# Patient Record
Sex: FEMALE | Race: WHITE | NOT HISPANIC OR LATINO | Employment: FULL TIME | URBAN - METROPOLITAN AREA
[De-identification: names, ages, dates, MRNs, and addresses within clinical notes are randomized per-mention and may not be internally consistent; named-entity substitution may affect disease eponyms.]

---

## 2017-03-23 ENCOUNTER — APPOINTMENT (OUTPATIENT)
Dept: WOMENS IMAGING | Facility: HOSPITAL | Age: 42
End: 2017-03-23

## 2017-03-23 PROCEDURE — 77063 BREAST TOMOSYNTHESIS BI: CPT | Performed by: RADIOLOGY

## 2017-03-23 PROCEDURE — 77067 SCR MAMMO BI INCL CAD: CPT | Performed by: RADIOLOGY

## 2019-05-28 ENCOUNTER — APPOINTMENT (OUTPATIENT)
Dept: WOMENS IMAGING | Facility: HOSPITAL | Age: 44
End: 2019-05-28

## 2019-05-28 PROCEDURE — 77067 SCR MAMMO BI INCL CAD: CPT | Performed by: RADIOLOGY

## 2019-05-28 PROCEDURE — 77063 BREAST TOMOSYNTHESIS BI: CPT | Performed by: RADIOLOGY

## 2020-07-30 ENCOUNTER — APPOINTMENT (OUTPATIENT)
Dept: WOMENS IMAGING | Facility: HOSPITAL | Age: 45
End: 2020-07-30

## 2020-07-30 PROCEDURE — 77063 BREAST TOMOSYNTHESIS BI: CPT | Performed by: RADIOLOGY

## 2020-07-30 PROCEDURE — 77067 SCR MAMMO BI INCL CAD: CPT | Performed by: RADIOLOGY

## 2020-10-06 ENCOUNTER — OFFICE VISIT (OUTPATIENT)
Dept: CARDIOLOGY | Facility: CLINIC | Age: 45
End: 2020-10-06

## 2020-10-06 VITALS
HEIGHT: 64 IN | BODY MASS INDEX: 31.55 KG/M2 | WEIGHT: 184.8 LBS | HEART RATE: 82 BPM | OXYGEN SATURATION: 100 % | SYSTOLIC BLOOD PRESSURE: 125 MMHG | DIASTOLIC BLOOD PRESSURE: 81 MMHG

## 2020-10-06 DIAGNOSIS — R07.9 CHEST PAIN, UNSPECIFIED TYPE: Primary | ICD-10-CM

## 2020-10-06 DIAGNOSIS — R06.02 SHORTNESS OF BREATH: ICD-10-CM

## 2020-10-06 DIAGNOSIS — R30.0 DYSURIA: ICD-10-CM

## 2020-10-06 DIAGNOSIS — R00.2 PALPITATIONS: ICD-10-CM

## 2020-10-06 PROCEDURE — 99204 OFFICE O/P NEW MOD 45 MIN: CPT | Performed by: PHYSICIAN ASSISTANT

## 2020-10-06 RX ORDER — ACETAMINOPHEN 500 MG
500 TABLET ORAL EVERY 6 HOURS PRN
COMMUNITY

## 2020-10-06 RX ORDER — LISINOPRIL 10 MG/1
TABLET ORAL DAILY
COMMUNITY
Start: 2020-09-29 | End: 2020-12-22

## 2020-10-06 RX ORDER — NITROGLYCERIN 0.4 MG/1
TABLET SUBLINGUAL
Qty: 25 TABLET | Refills: 11 | Status: SHIPPED | OUTPATIENT
Start: 2020-10-06

## 2020-10-06 RX ORDER — IBUPROFEN 600 MG/1
600 TABLET ORAL EVERY 6 HOURS PRN
COMMUNITY

## 2020-10-06 NOTE — PROGRESS NOTES
Subjective   Eva Saleem is a 45 y.o. female     Chief Complaint   Patient presents with   • Establish Care       HPI    Patient is a 45-year-old female that presents to the office today to establish care.    Recently, patient was in the emergency department.  Patient describes being at home 1 day whenever she was sitting and started feeling chest discomfort.  She felt pressure or an aching sensation in the substernal region.  She went to take her blood pressure and apparently it was increasing.  Because of this she went to the emergency room and was ruled out.  It was noted that she had hypertension.  She was recommended to follow-up as an outpatient    She was since placed on lisinopril which has helped her hypertension but she still occasionally has mild discomfort.  It has not been as severe as what she felt on the day of emergency room visit but has been significant.  She does not describe nausea or diaphoresis with her pain.  She does not describe radiation.  She has mild dyspnea with chest discomfort.  No severe or significant exertional dyspnea.  She does not describe PND orthopnea.    She occasionally will feel palpitations.  She describes at times that she will feel uncomfortable sensation as if she is having palpitations and will break out in a sweat before it resolves quickly.  She has had this at random times but it has been noticeable and concerning for her.  She does not describe any syncopal episodes.  Otherwise she is doing well      Current Outpatient Medications   Medication Sig Dispense Refill   • acetaminophen (TYLENOL) 500 MG tablet Take 500 mg by mouth Every 6 (Six) Hours As Needed for Mild Pain .     • ibuprofen (ADVIL,MOTRIN) 600 MG tablet Take 600 mg by mouth Every 6 (Six) Hours As Needed for Mild Pain .     • lisinopril (PRINIVIL,ZESTRIL) 10 MG tablet Take  by mouth Daily.     • nitroglycerin (NITROSTAT) 0.4 MG SL tablet 1 under the tongue as needed for angina, may repeat q5mins for up  "three doses 25 tablet 11     No current facility-administered medications for this visit.        Patient has no known allergies.    History reviewed. No pertinent past medical history.    Social History     Socioeconomic History   • Marital status:      Spouse name: Not on file   • Number of children: Not on file   • Years of education: Not on file   • Highest education level: Not on file   Tobacco Use   • Smoking status: Never Smoker   Substance and Sexual Activity   • Alcohol use: Not Currently     Frequency: Never   • Drug use: Never   • Sexual activity: Defer       Family History   Problem Relation Age of Onset   • Lymphoma Mother    • Hypertension Father    • Diabetes Father    • Gout Sister    • Diabetes Brother        Review of Systems   Constitutional: Negative.    Eyes: Positive for visual disturbance (Reading glasses).   Respiratory: Positive for chest tightness (\"slight tightness\" ) and shortness of breath (Off and on, states it can occur at rest. States it's been an issue recently. ).    Cardiovascular: Positive for chest pain (Occasional \"twinges\" of pain, descirbes as a \"sharp tightness\" ) and palpitations. Negative for leg swelling.   Endocrine: Negative.    Musculoskeletal: Negative.    Skin: Negative.    Allergic/Immunologic: Positive for environmental allergies (animal dander). Negative for food allergies.   Neurological: Positive for headaches (Off and on, denies hx migraines ). Negative for dizziness, syncope, weakness, light-headedness and numbness.   Hematological: Negative.    Psychiatric/Behavioral: Negative.    All other systems reviewed and are negative.      Objective   Vitals:    10/06/20 1455   BP: 125/81   Pulse: 82   SpO2: 100%   Weight: 83.8 kg (184 lb 12.8 oz)   Height: 162.6 cm (64\")      /81   Pulse 82   Ht 162.6 cm (64\")   Wt 83.8 kg (184 lb 12.8 oz)   SpO2 100%   BMI 31.72 kg/m²     Lab Results (most recent)     None          Physical Exam  Vitals signs and " nursing note reviewed.   Constitutional:       General: She is not in acute distress.     Appearance: Normal appearance. She is well-developed.   HENT:      Head: Normocephalic and atraumatic.   Eyes:      General: No scleral icterus.        Right eye: No discharge.         Left eye: No discharge.      Conjunctiva/sclera: Conjunctivae normal.   Neck:      Vascular: No carotid bruit.   Cardiovascular:      Rate and Rhythm: Normal rate and regular rhythm.      Heart sounds: Normal heart sounds. No murmur. No friction rub. No gallop.    Pulmonary:      Effort: Pulmonary effort is normal. No respiratory distress.      Breath sounds: Normal breath sounds. No wheezing or rales.   Chest:      Chest wall: No tenderness.   Musculoskeletal:      Right lower leg: No edema.      Left lower leg: No edema.   Skin:     General: Skin is warm and dry.      Coloration: Skin is not pale.      Findings: No erythema or rash.   Neurological:      Mental Status: She is alert and oriented to person, place, and time.      Cranial Nerves: No cranial nerve deficit.   Psychiatric:         Behavior: Behavior normal.         Procedure   Procedures         Assessment/Plan     Problems Addressed this Visit        Cardiovascular and Mediastinum    Palpitations    Relevant Orders    Adult Transthoracic Echo Complete W/ Cont if Necessary Per Protocol    Cardiac Event Monitor    Treadmill Stress Test    CBC & Differential    Urinalysis With Culture If Indicated -       Respiratory    Shortness of breath    Relevant Orders    Adult Transthoracic Echo Complete W/ Cont if Necessary Per Protocol    Cardiac Event Monitor    Treadmill Stress Test    CBC & Differential    Urinalysis With Culture If Indicated -       Nervous and Auditory    Chest pain - Primary    Relevant Orders    Adult Transthoracic Echo Complete W/ Cont if Necessary Per Protocol    Cardiac Event Monitor    Treadmill Stress Test    CBC & Differential    Urinalysis With Culture If Indicated  -      Other Visit Diagnoses     Dysuria        Relevant Orders    CBC & Differential    Urinalysis With Culture If Indicated -      Diagnoses       Codes Comments    Chest pain, unspecified type    -  Primary ICD-10-CM: R07.9  ICD-9-CM: 786.50     Palpitations     ICD-10-CM: R00.2  ICD-9-CM: 785.1     Shortness of breath     ICD-10-CM: R06.02  ICD-9-CM: 786.05     Dysuria     ICD-10-CM: R30.0  ICD-9-CM: 788.1             Recommendation  1.  Patient with complaints of chest pain and dyspnea.  Chest pain is atypical but is concerning.  I do feel evaluation is warranted to rule out cardiac causes of her symptoms    2.  Patient will undergo regular treadmill stress test for risk stratification    3.  Echocardiogram to evaluate LV structure, LV function, rule out pericardial disease such as an effusion etc.    4.  Event monitor because of her symptoms of dizziness diaphoresis and palpitations.  It will be scheduled for 2 weeks    5.  On review of labs, she had a white count of 14,000.  There was no left shift.  She has not been on steroids.  However she is concerned.  She is concerned about the possibility of infection.  I will repeat a CBC to ensure that this has improved.  She would like urinalysis performed and per her wishes we will order it.  If abnormal, we can send results to primary.  Again patient requests    6.  We will prescribe nitroglycerin as needed for chest pain and I have counseled her on how to use that medication.  She is to continue antihypertensive therapy.  We will see her back for follow-up on testing.  She is to follow with primary as scheduled           Eva Saleem  reports that she has never smoked. She does not have any smokeless tobacco history on file.     Patient's Body mass index is 31.72 kg/m². BMI is above normal parameters. Recommendations include: educational material.         Electronically signed by:

## 2020-10-06 NOTE — PATIENT INSTRUCTIONS
How to Quarantine at Home  Information for Patients and Families    These instructions are for people with confirmed or suspected COVID-19 who do not need to be hospitalized and those with confirmed COVID-19 who were hospitalized and discharged to care for themselves at home.    If you were tested through the Health Department  The Health Department will monitor your wellbeing.  If it is determined that you do not need to be hospitalized and can be isolated at home, you will be monitored by staff from your local or state health department.     If you were tested through a Commercial Lab  You will need to monitor yourself and report changes in your symptoms to your doctor.  See the section below called Monitor Your Symptoms.    Follow these steps until a healthcare provider or local or state health department says you can return to your normal activities.    Stay home except to get medical care  • Restrict activities outside your home, except for getting medical care.   • Do not go to work, school, or public areas.   • Avoid using public transportation, ride-sharing, or taxis.    Separate yourself from other people and animals in your home  People  As much as possible, you should stay in a specific room and away from other people in your home. Also, you should use a separate bathroom, if available.    Animals  You should restrict contact with pets and other animals while you are sick with COVID-19, just like you would around other people. When possible, have another member of your household care for your animals while you are sick. If you are sick with COVID-19, avoid contact with your pet, including petting, snuggling, being kissed or licked, and sharing food. If you must care for your pet or be around animals while you are sick, wash your hands before and after you interact with pets and wear a facemask. See COVID-19 and Animals for more information.    Call ahead before visiting your doctor  If you have a medical  appointment, call the healthcare provider and tell them that you have or may have COVID-19. This information will help the healthcare provider’s office take steps to keep other people from getting infected or exposed.    Wear a facemask  You should wear a facemask when you are around other people (e.g., sharing a room or vehicle) or pets and before you enter a healthcare provider’s office.     If you are not able to wear a facemask (for example, because it causes trouble breathing), then people who live with you should not stay in the same room with you, or they should wear a facemask if they enter your room.    Cover your coughs and sneezes  • Cover your mouth and nose with a tissue when you cough or sneeze.   • Throw used tissues in a lined trash can.   • Immediately wash your hands with soap and water for at least 20 seconds or, if soap and water are not available, clean your hands with an alcohol-based hand  that contains at least 60% alcohol.    Clean your hands often  • Wash your hands often with soap and water for at least 20 seconds, especially after blowing your nose, coughing, or sneezing; going to the bathroom; and before eating or preparing food.     • If soap and water are not readily available, use an alcohol-based hand  with at least 60% alcohol, covering all surfaces of your hands and rubbing them together until they feel dry.    • Soap and water are the best option if hands are visibly dirty. Avoid touching your eyes, nose, and mouth with unwashed hands.    Avoid sharing personal household items  • You should not share dishes, drinking glasses, cups, eating utensils, towels, or bedding with other people or pets in your home.   • After using these items, they should be washed thoroughly with soap and water.    Clean all “high-touch” surfaces everyday  • High touch surfaces include counters, tabletops, doorknobs, bathroom fixtures, toilets, phones, keyboards, tablets, and bedside  tables.   • Also, clean any surfaces that may have blood, stool, or body fluids on them.   • Use a household cleaning spray or wipe, according to the label instructions. Labels contain instructions for safe and effective use of the cleaning product, including precautions you should take when applying the product, such as wearing gloves and making sure you have good ventilation during use of the product.    Monitor your symptoms  • Seek prompt medical attention if your illness is worsening (e.g., difficulty breathing).   • Before seeking care, call your healthcare provider and tell them that you have, or are being evaluated for, COVID-19.   • Put on a facemask before you enter the facility.     • These steps will help the healthcare provider’s office to keep other people in the office or waiting room from getting infected or exposed.   • Persons who are placed under active monitoring or facilitated self-monitoring should follow instructions provided by their local health department or occupational health professionals, as appropriate.  • If you have a medical emergency and need to call 911, notify the dispatch personnel that you have, or are being evaluated for COVID-19. If possible, put on a facemask before emergency medical services arrive.    Discontinuing home isolation  Patients with confirmed COVID-19 should remain under home isolation precautions until the risk of secondary transmission to others is thought to be low. The decision to discontinue home isolation precautions should be made on a case-by-case basis, in consultation with healthcare providers and state and local health departments.    The below content are for household members, intimate partners, and caregivers of a patient with symptomatic laboratory-confirmed COVID-19 or a patient under investigation:    Household members, intimate partners, and caregivers may have close contact with a person with symptomatic, laboratory-confirmed COVID-19 or a  person under investigation.     Close contacts should monitor their health; they should call their healthcare provider right away if they develop symptoms suggestive of COVID-19 (e.g., fever, cough, shortness of breath)     Close contacts should also follow these recommendations:  • Make sure that you understand and can help the patient follow their healthcare provider’s instructions for medication(s) and care. You should help the patient with basic needs in the home and provide support for getting groceries, prescriptions, and other personal needs.  • Monitor the patient’s symptoms. If the patient is getting sicker, call his or her healthcare provider and tell them that the patient has laboratory-confirmed COVID-19. This will help the healthcare provider’s office take steps to keep other people in the office or waiting room from getting infected. Ask the healthcare provider to call the local or [facility] health department for additional guidance. If the patient has a medical emergency and you need to call 911, notify the dispatch personnel that the patient has, or is being evaluated for COVID-19.  • Household members should stay in another room or be  from the patient as much as possible. Household members should use a separate bedroom and bathroom, if available.  • Prohibit visitors who do not have an essential need to be in the home.  • Household members should care for any pets in the home. Do not handle pets or other animals while sick.  For more information, see COVID-19 and Animals.  • Make sure that shared spaces in the home have good air flow, such as by an air conditioner or an opened window, weather permitting.  • Perform hand hygiene frequently. Wash your hands often with soap and water for at least 20 seconds or use an alcohol-based hand  that contains 60 to 95% alcohol, covering all surfaces of your hands and rubbing them together until they feel dry. Soap and water should be used  preferentially if hands are visibly dirty.  • Avoid touching your eyes, nose, and mouth with unwashed hands.  • The patient should wear a facemask when you are around other people. If the patient is not able to wear a facemask (for example, because it causes trouble breathing), you, as the caregiver, should wear a mask when you are in the same room as the patient.  • Wear a disposable facemask and gloves when you touch or have contact with the patient’s blood, stool, or body fluids, such as saliva, sputum, nasal mucus, vomit, or urine.   o Throw out disposable facemasks and gloves after using them. Do not reuse.  o When removing personal protective equipment, first remove and dispose of gloves. Then, immediately clean your hands with soap and water or alcohol-based hand . Next, remove and dispose of facemask, and immediately clean your hands again with soap and water or alcohol-based hand .  • Avoid sharing household items with the patient. You should not share dishes, drinking glasses, cups, eating utensils, towels, bedding, or other items. After the patient uses these items, you should wash them thoroughly (see below “Wash laundry thoroughly”).  • Clean all “high-touch” surfaces, such as counters, tabletops, doorknobs, bathroom fixtures, toilets, phones, keyboards, tablets, and bedside tables, every day. Also, clean any surfaces that may have blood, stool, or body fluids on them.   o Use a household cleaning spray or wipe, according to the label instructions. Labels contain instructions for safe and effective use of the cleaning product including precautions you should take when applying the product, such as wearing gloves and making sure you have good ventilation during use of the product.  • Wash laundry thoroughly.   o Immediately remove and wash clothes or bedding that have blood, stool, or body fluids on them.  o Wear disposable gloves while handling soiled items and keep soiled items away  from your body. Clean your hands (with soap and water or an alcohol-based hand ) immediately after removing your gloves.  o Read and follow directions on labels of laundry or clothing items and detergent. In general, using a normal laundry detergent according to washing machine instructions and dry thoroughly using the warmest temperatures recommended on the clothing label.  • Place all used disposable gloves, facemasks, and other contaminated items in a lined container before disposing of them with other household waste. Clean your hands (with soap and water or an alcohol-based hand ) immediately after handling these items. Soap and water should be used preferentially if hands are visibly dirty.  • Discuss any additional questions with your state or local health department or healthcare provider.    Adapted from information provided by the Centers for Disease Control and Prevention.  For more information, visit https://www.cdc.gov/coronavirus/2019-ncov/hcp/guidance-prevent-spread.htmlFat and Cholesterol Restricted Eating Plan  Getting too much fat and cholesterol in your diet may cause health problems. Choosing the right foods helps keep your fat and cholesterol at normal levels. This can keep you from getting certain diseases.  Your doctor may recommend an eating plan that includes:  · Total fat: ______% or less of total calories a day.  · Saturated fat: ______% or less of total calories a day.  · Cholesterol: less than _________mg a day.  · Fiber: ______g a day.  What are tips for following this plan?  Meal planning  · At meals, divide your plate into four equal parts:  ? Fill one-half of your plate with vegetables and green salads.  ? Fill one-fourth of your plate with whole grains.  ? Fill one-fourth of your plate with low-fat (lean) protein foods.  · Eat fish that is high in omega-3 fats at least two times a week. This includes mackerel, tuna, sardines, and salmon.  · Eat foods that are high  "in fiber, such as whole grains, beans, apples, broccoli, carrots, peas, and barley.  General tips    · Work with your doctor to lose weight if you need to.  · Avoid:  ? Foods with added sugar.  ? Fried foods.  ? Foods with partially hydrogenated oils.  · Limit alcohol intake to no more than 1 drink a day for nonpregnant women and 2 drinks a day for men. One drink equals 12 oz of beer, 5 oz of wine, or 1½ oz of hard liquor.  Reading food labels  · Check food labels for:  ? Trans fats.  ? Partially hydrogenated oils.  ? Saturated fat (g) in each serving.  ? Cholesterol (mg) in each serving.  ? Fiber (g) in each serving.  · Choose foods with healthy fats, such as:  ? Monounsaturated fats.  ? Polyunsaturated fats.  ? Omega-3 fats.  · Choose grain products that have whole grains. Look for the word \"whole\" as the first word in the ingredient list.  Cooking  · Cook foods using low-fat methods. These include baking, boiling, grilling, and broiling.  · Eat more home-cooked foods. Eat at restaurants and buffets less often.  · Avoid cooking using saturated fats, such as butter, cream, palm oil, palm kernel oil, and coconut oil.  Recommended foods    Fruits  · All fresh, canned (in natural juice), or frozen fruits.  Vegetables  · Fresh or frozen vegetables (raw, steamed, roasted, or grilled). Green salads.  Grains  · Whole grains, such as whole wheat or whole grain breads, crackers, cereals, and pasta. Unsweetened oatmeal, bulgur, barley, quinoa, or brown rice. Corn or whole wheat flour tortillas.  Meats and other protein foods  · Ground beef (85% or leaner), grass-fed beef, or beef trimmed of fat. Skinless chicken or turkey. Ground chicken or turkey. Pork trimmed of fat. All fish and seafood. Egg whites. Dried beans, peas, or lentils. Unsalted nuts or seeds. Unsalted canned beans. Nut butters without added sugar or oil.  Dairy  · Low-fat or nonfat dairy products, such as skim or 1% milk, 2% or reduced-fat cheeses, low-fat and " fat-free ricotta or cottage cheese, or plain low-fat and nonfat yogurt.  Fats and oils  · Tub margarine without trans fats. Light or reduced-fat mayonnaise and salad dressings. Avocado. Olive, canola, sesame, or safflower oils.  The items listed above may not be a complete list of foods and beverages you can eat. Contact a dietitian for more information.  Foods to avoid  Fruits  · Canned fruit in heavy syrup. Fruit in cream or butter sauce. Fried fruit.  Vegetables  · Vegetables cooked in cheese, cream, or butter sauce. Fried vegetables.  Grains  · White bread. White pasta. White rice. Cornbread. Bagels, pastries, and croissants. Crackers and snack foods that contain trans fat and hydrogenated oils.  Meats and other protein foods  · Fatty cuts of meat. Ribs, chicken wings, mcmahon, sausage, bologna, salami, chitterlings, fatback, hot dogs, bratwurst, and packaged lunch meats. Liver and organ meats. Whole eggs and egg yolks. Chicken and turkey with skin. Fried meat.  Dairy  · Whole or 2% milk, cream, half-and-half, and cream cheese. Whole milk cheeses. Whole-fat or sweetened yogurt. Full-fat cheeses. Nondairy creamers and whipped toppings. Processed cheese, cheese spreads, and cheese curds.  Beverages  · Alcohol. Sugar-sweetened drinks such as sodas, lemonade, and fruit drinks.  Fats and oils  · Butter, stick margarine, lard, shortening, ghee, or mcmahon fat. Coconut, palm kernel, and palm oils.  Sweets and desserts  · Corn syrup, sugars, honey, and molasses. Candy. Jam and jelly. Syrup. Sweetened cereals. Cookies, pies, cakes, donuts, muffins, and ice cream.  The items listed above may not be a complete list of foods and beverages you should avoid. Contact a dietitian for more information.  Summary  · Choosing the right foods helps keep your fat and cholesterol at normal levels. This can keep you from getting certain diseases.  · At meals, fill one-half of your plate with vegetables and green salads.  · Eat high-fiber  "foods, like whole grains, beans, apples, carrots, peas, and barley.  · Limit added sugar, saturated fats, alcohol, and fried foods.  This information is not intended to replace advice given to you by your health care provider. Make sure you discuss any questions you have with your health care provider.  Document Released: 06/18/2013 Document Revised: 08/21/2019 Document Reviewed: 09/04/2018  [employer] Patient Education © 2020 [employer]  BMI for Adults    Body mass index (BMI) is a number that is calculated from a person's weight and height. BMI may help to estimate how much of a person's weight is composed of fat. BMI can help identify those who may be at higher risk for certain medical problems.  How is BMI used with adults?  BMI is used as a screening tool to identify possible weight problems. It is used to check whether a person is obese, overweight, healthy weight, or underweight.  How is BMI calculated?  BMI measures your weight and compares it to your height. This can be done either in English ([place] or metric measurements. Note that charts are available to help you find your BMI quickly and easily without having to do these calculations yourself.  To calculate your BMI in English ([place] measurements, your health care provider will:  1. Measure your weight in pounds (lb).  2. Multiply the number of pounds by 703.  ? For example, for a person who weighs 180 lb, multiply that number by 703, which equals 126,540.  3. Measure your height in inches (in). Then multiply that number by itself to get a measurement called \"inches squared.\"  ? For example, for a person who is 70 in tall, the \"inches squared\" measurement is 70 in x 70 in, which equals 4900 inches squared.  4. Divide the total from Step 2 (number of lb x 703) by the total from Step 3 (inches squared): 126,540 ÷ 4900 = 25.8. This is your BMI.  To calculate your BMI in metric measurements, your health care provider will:  1. Measure your weight in " "kilograms (kg).  2. Measure your height in meters (m). Then multiply that number by itself to get a measurement called \"meters squared.\"  ? For example, for a person who is 1.75 m tall, the \"meters squared\" measurement is 1.75 m x 1.75 m, which is equal to 3.1 meters squared.  3. Divide the number of kilograms (your weight) by the meters squared number. In this example: 70 ÷ 3.1 = 22.6. This is your BMI.  How is BMI interpreted?  To interpret your results, your health care provider will use BMI charts to identify whether you are underweight, normal weight, overweight, or obese. The following guidelines will be used:  · Underweight: BMI less than 18.5.  · Normal weight: BMI between 18.5 and 24.9.  · Overweight: BMI between 25 and 29.9.  · Obese: BMI of 30 and above.  Please note:  · Weight includes both fat and muscle, so someone with a muscular build, such as an athlete, may have a BMI that is higher than 24.9. In cases like these, BMI is not an accurate measure of body fat.  · To determine if excess body fat is the cause of a BMI of 25 or higher, further assessments may need to be done by a health care provider.  · BMI is usually interpreted in the same way for men and women.  Why is BMI a useful tool?  BMI is useful in two ways:  · Identifying a weight problem that may be related to a medical condition, or that may increase the risk for medical problems.  · Promoting lifestyle and diet changes in order to reach a healthy weight.  Summary  · Body mass index (BMI) is a number that is calculated from a person's weight and height.  · BMI may help to estimate how much of a person's weight is composed of fat. BMI can help identify those who may be at higher risk for certain medical problems.  · BMI can be measured using English measurements or metric measurements.  · To interpret your results, your health care provider will use BMI charts to identify whether you are underweight, normal weight, overweight, or " obese.  This information is not intended to replace advice given to you by your health care provider. Make sure you discuss any questions you have with your health care provider.  Document Released: 08/29/2005 Document Revised: 11/30/2018 Document Reviewed: 10/31/2018  [employer] Patient Education © 2020 [employer]

## 2020-10-13 ENCOUNTER — HOSPITAL ENCOUNTER (OUTPATIENT)
Dept: CARDIOLOGY | Facility: HOSPITAL | Age: 45
Discharge: HOME | End: 2020-10-13

## 2020-10-13 DIAGNOSIS — R06.02 SHORTNESS OF BREATH: ICD-10-CM

## 2020-10-13 DIAGNOSIS — R07.9 CHEST PAIN, UNSPECIFIED TYPE: ICD-10-CM

## 2020-10-13 DIAGNOSIS — R00.2 PALPITATIONS: ICD-10-CM

## 2020-10-13 PROCEDURE — 93356 MYOCRD STRAIN IMG SPCKL TRCK: CPT

## 2020-10-13 PROCEDURE — 93308 TTE F-UP OR LMTD: CPT | Performed by: INTERNAL MEDICINE

## 2020-10-13 PROCEDURE — 93017 CV STRESS TEST TRACING ONLY: CPT

## 2020-10-13 PROCEDURE — 93321 DOPPLER ECHO F-UP/LMTD STD: CPT | Performed by: INTERNAL MEDICINE

## 2020-10-13 PROCEDURE — 93018 CV STRESS TEST I&R ONLY: CPT | Performed by: INTERNAL MEDICINE

## 2020-10-13 PROCEDURE — 93325 DOPPLER ECHO COLOR FLOW MAPG: CPT | Performed by: INTERNAL MEDICINE

## 2020-10-13 PROCEDURE — 93306 TTE W/DOPPLER COMPLETE: CPT

## 2020-10-13 RX ORDER — ASPIRIN 81 MG/1
81 TABLET, CHEWABLE ORAL NIGHTLY
COMMUNITY
End: 2022-03-02

## 2020-10-20 ENCOUNTER — TELEPHONE (OUTPATIENT)
Dept: CARDIOLOGY | Facility: CLINIC | Age: 45
End: 2020-10-20

## 2020-10-20 LAB
BH CV ECHO MEAS - % IVS THICK: 65.7 %
BH CV ECHO MEAS - % LVPW THICK: 79 %
BH CV ECHO MEAS - ACS: 2.3 CM
BH CV ECHO MEAS - AO ROOT AREA (BSA CORRECTED): 1.7
BH CV ECHO MEAS - AO ROOT AREA: 7.8 CM^2
BH CV ECHO MEAS - AO ROOT DIAM: 3.2 CM
BH CV ECHO MEAS - BSA(HAYCOCK): 2 M^2
BH CV ECHO MEAS - BSA: 1.9 M^2
BH CV ECHO MEAS - BZI_BMI: 31.6 KILOGRAMS/M^2
BH CV ECHO MEAS - BZI_METRIC_HEIGHT: 162.6 CM
BH CV ECHO MEAS - BZI_METRIC_WEIGHT: 83.5 KG
BH CV ECHO MEAS - EDV(CUBED): 89.9 ML
BH CV ECHO MEAS - EDV(MOD-SP4): 29.4 ML
BH CV ECHO MEAS - EDV(TEICH): 91.5 ML
BH CV ECHO MEAS - EF(CUBED): 75.8 %
BH CV ECHO MEAS - EF(MOD-SP4): 58.2 %
BH CV ECHO MEAS - EF(TEICH): 68 %
BH CV ECHO MEAS - ESV(CUBED): 21.7 ML
BH CV ECHO MEAS - ESV(MOD-SP4): 12.3 ML
BH CV ECHO MEAS - ESV(TEICH): 29.3 ML
BH CV ECHO MEAS - FS: 37.7 %
BH CV ECHO MEAS - IVS/LVPW: 1
BH CV ECHO MEAS - IVSD: 1 CM
BH CV ECHO MEAS - IVSS: 1.7 CM
BH CV ECHO MEAS - LA DIMENSION: 3.1 CM
BH CV ECHO MEAS - LA/AO: 0.97
BH CV ECHO MEAS - LV DIASTOLIC VOL/BSA (35-75): 15.6 ML/M^2
BH CV ECHO MEAS - LV MASS(C)D: 155.4 GRAMS
BH CV ECHO MEAS - LV MASS(C)DI: 82.3 GRAMS/M^2
BH CV ECHO MEAS - LV MASS(C)S: 189.6 GRAMS
BH CV ECHO MEAS - LV MASS(C)SI: 100.4 GRAMS/M^2
BH CV ECHO MEAS - LV SYSTOLIC VOL/BSA (12-30): 6.5 ML/M^2
BH CV ECHO MEAS - LVIDD: 4.5 CM
BH CV ECHO MEAS - LVIDS: 2.8 CM
BH CV ECHO MEAS - LVLD AP4: 6.3 CM
BH CV ECHO MEAS - LVLS AP4: 5.7 CM
BH CV ECHO MEAS - LVOT AREA (M): 3.1 CM^2
BH CV ECHO MEAS - LVOT AREA: 3.1 CM^2
BH CV ECHO MEAS - LVOT DIAM: 2 CM
BH CV ECHO MEAS - LVPWD: 1 CM
BH CV ECHO MEAS - LVPWS: 1.8 CM
BH CV ECHO MEAS - MV A MAX VEL: 73.3 CM/SEC
BH CV ECHO MEAS - MV E MAX VEL: 72.4 CM/SEC
BH CV ECHO MEAS - MV E/A: 0.99
BH CV ECHO MEAS - PA ACC TIME: 0.11 SEC
BH CV ECHO MEAS - PA PR(ACCEL): 31.3 MMHG
BH CV ECHO MEAS - RVDD: 1.6 CM
BH CV ECHO MEAS - SI(CUBED): 36.1 ML/M^2
BH CV ECHO MEAS - SI(MOD-SP4): 9.1 ML/M^2
BH CV ECHO MEAS - SI(TEICH): 32.9 ML/M^2
BH CV ECHO MEAS - SV(CUBED): 68.2 ML
BH CV ECHO MEAS - SV(MOD-SP4): 17.1 ML
BH CV ECHO MEAS - SV(TEICH): 62.2 ML
BH CV STRESS BP STAGE 1: NORMAL
BH CV STRESS BP STAGE 2: NORMAL
BH CV STRESS DURATION MIN STAGE 1: 3
BH CV STRESS DURATION MIN STAGE 2: 3
BH CV STRESS DURATION SEC STAGE 1: 0
BH CV STRESS DURATION SEC STAGE 2: 0
BH CV STRESS GRADE STAGE 1: 10
BH CV STRESS GRADE STAGE 2: 12
BH CV STRESS HR STAGE 1: 144
BH CV STRESS HR STAGE 2: 153
BH CV STRESS METS STAGE 1: 5
BH CV STRESS METS STAGE 2: 7.5
BH CV STRESS PROTOCOL 1: NORMAL
BH CV STRESS RECOVERY BP: NORMAL MMHG
BH CV STRESS RECOVERY HR: 97 BPM
BH CV STRESS SPEED STAGE 1: 1.7
BH CV STRESS SPEED STAGE 2: 2.5
BH CV STRESS STAGE 1: 1
BH CV STRESS STAGE 2: 2
MAXIMAL PREDICTED HEART RATE: 175 BPM
MAXIMAL PREDICTED HEART RATE: 175 BPM
PERCENT MAX PREDICTED HR: 87.43 %
STRESS BASELINE BP: NORMAL MMHG
STRESS BASELINE HR: 101 BPM
STRESS PERCENT HR: 103 %
STRESS POST ESTIMATED WORKLOAD: 7 METS
STRESS POST EXERCISE DUR MIN: 4 MIN
STRESS POST EXERCISE DUR SEC: 53 SEC
STRESS POST PEAK BP: NORMAL MMHG
STRESS POST PEAK HR: 153 BPM
STRESS TARGET HR: 149 BPM
STRESS TARGET HR: 149 BPM

## 2020-10-20 NOTE — TELEPHONE ENCOUNTER
Called pt, went over test results. Informed pt of upcoming appt. Verbalized understanding and had not further questions at this time. [facility]          SCANNED - LABS  Order: [id number]  Status:  Final result   Visible to patient:  Yes (MyChart)         Last Resulted: 10/06/20        Order Details      View Encounter      Lab and Collection Details      Routing      Result History           Scans on Order [id number]    Scan on 10/6/2020 by Eric Pham PA: LABS-[facility],URINALYSIS -[facility]          Result Communications      Result Notes     Eric Pham PA   10/7/2020  1:02 PM      Let patient know the results are normal

## 2020-10-20 NOTE — TELEPHONE ENCOUNTER
Echo:  Visually estimated ejection fraction is 55%      Stress:  1.  Poor functional capacity without chest pain.  The patient requested the study be discontinued because of shortness of breath.  Adequate heart rate was achieved.     2.  Physiologic heart rate and blood pressure responses to exercise.     3.  No EKG evidence of ischemia.     4.  No exercise-induced dysrhythmia.        Keep appt on 1/18/2021 at 10:15am.         Called and informed pt of results and appt, she verbalized understanding.

## 2020-11-04 ENCOUNTER — OFFICE VISIT (OUTPATIENT)
Dept: CARDIOLOGY | Facility: CLINIC | Age: 45
End: 2020-11-04

## 2020-11-04 VITALS
BODY MASS INDEX: 31.07 KG/M2 | DIASTOLIC BLOOD PRESSURE: 85 MMHG | HEART RATE: 83 BPM | TEMPERATURE: 98.4 F | HEIGHT: 64 IN | SYSTOLIC BLOOD PRESSURE: 124 MMHG | WEIGHT: 182 LBS | OXYGEN SATURATION: 99 %

## 2020-11-04 DIAGNOSIS — R00.2 PALPITATIONS: ICD-10-CM

## 2020-11-04 DIAGNOSIS — R07.9 CHEST PAIN, UNSPECIFIED TYPE: Primary | ICD-10-CM

## 2020-11-04 DIAGNOSIS — R06.02 SHORTNESS OF BREATH: ICD-10-CM

## 2020-11-04 PROCEDURE — 99213 OFFICE O/P EST LOW 20 MIN: CPT | Performed by: PHYSICIAN ASSISTANT

## 2020-11-04 RX ORDER — HYDROCHLOROTHIAZIDE 25 MG/1
25 TABLET ORAL NIGHTLY
COMMUNITY
End: 2022-10-11

## 2020-11-04 NOTE — PROGRESS NOTES
Problem list     Subjective   Eva Saleem is a 45 y.o. female     Chief Complaint   Patient presents with   • Follow-up       HPI    Patient is a 45-year-old female that presents back to the office for follow-up.    Patient initially presented to the office with complaints of chest discomfort after having an emergency room visit.  She would feel pressure substernally and she would have palpitations and shortness of breath.  This is something that occurred in prompted cardiac referral.  We ordered testing to include regular treadmill stress test which was normal.  She had echocardiogram which demonstrated good LV function with no significant structural disease or valvular heart disease.  Event monitor still pending at this time    She has had 1 other emergency room visits since being seen here when she felt discomfort in her chest substernally.  Work-up in the emergency room was benign.  Patient has since followed up with primary.    She feels better.  The emergency room visit was October 23 and since that time she feels better.  She does describe some anxiety being an issue.  She does not have any discomfort since that time.  Her dyspnea is very mild.  She does not describe progressive shortness of breath.  She occasionally still has palpitations but does not necessarily correlate that with her chest pain.  Prior chest pain however, as described above, has improved.  She feels well at this time      Current Outpatient Medications on File Prior to Visit   Medication Sig Dispense Refill   • acetaminophen (TYLENOL) 500 MG tablet Take 500 mg by mouth Every 6 (Six) Hours As Needed for Mild Pain .     • aspirin 81 MG chewable tablet Chew 81 mg Daily.     • hydroCHLOROthiazide (HYDRODIURIL) 25 MG tablet Take 25 mg by mouth Daily.     • ibuprofen (ADVIL,MOTRIN) 600 MG tablet Take 600 mg by mouth Every 6 (Six) Hours As Needed for Mild Pain .     • Loratadine-Pseudoephedrine (CLARITIN-D 24 HOUR PO) Take  by mouth.     •  "nitroglycerin (NITROSTAT) 0.4 MG SL tablet 1 under the tongue as needed for angina, may repeat q5mins for up three doses 25 tablet 11   • lisinopril (PRINIVIL,ZESTRIL) 10 MG tablet Take  by mouth Daily.       No current facility-administered medications on file prior to visit.        Patient has no known allergies.    History reviewed. No pertinent past medical history.    Social History     Socioeconomic History   • Marital status:      Spouse name: Not on file   • Number of children: Not on file   • Years of education: Not on file   • Highest education level: Not on file   Tobacco Use   • Smoking status: Never Smoker   Substance and Sexual Activity   • Alcohol use: Not Currently     Frequency: Never   • Drug use: Never   • Sexual activity: Defer       Family History   Problem Relation Age of Onset   • Lymphoma Mother    • Hypertension Father    • Diabetes Father    • Gout Sister    • Diabetes Brother        Review of Systems   Constitutional: Negative.    Respiratory: Positive for shortness of breath (a little with exertion). Negative for chest tightness and wheezing.    Cardiovascular: Negative.  Negative for chest pain, palpitations and leg swelling.   Endocrine: Negative.    Musculoskeletal: Negative.    Skin: Negative.    Allergic/Immunologic: Positive for environmental allergies (seasonal). Negative for food allergies.   Neurological: Positive for light-headedness (occasional) and headaches (occasional). Negative for dizziness, weakness and numbness.   Hematological: Bruises/bleeds easily (bruise easily).   Psychiatric/Behavioral: Negative.  Negative for sleep disturbance.   All other systems reviewed and are negative.      Objective   Vitals:    11/04/20 1050   BP: 124/85   Pulse: 83   Temp: 98.4 °F (36.9 °C)   SpO2: 99%   Weight: 82.6 kg (182 lb)   Height: 162.6 cm (64.02\")      /85   Pulse 83   Temp 98.4 °F (36.9 °C)   Ht 162.6 cm (64.02\")   Wt 82.6 kg (182 lb)   SpO2 99%   BMI 31.22 " kg/m²     Lab Results (most recent)     None          Physical Exam  Vitals signs and nursing note reviewed.   Constitutional:       General: She is not in acute distress.     Appearance: Normal appearance. She is well-developed.   HENT:      Head: Normocephalic and atraumatic.   Eyes:      General: No scleral icterus.        Right eye: No discharge.         Left eye: No discharge.      Conjunctiva/sclera: Conjunctivae normal.   Neck:      Vascular: No carotid bruit.   Cardiovascular:      Rate and Rhythm: Normal rate and regular rhythm.      Heart sounds: Normal heart sounds. No murmur. No friction rub. No gallop.    Pulmonary:      Effort: Pulmonary effort is normal. No respiratory distress.      Breath sounds: Normal breath sounds. No wheezing or rales.   Chest:      Chest wall: No tenderness.   Musculoskeletal:      Right lower leg: No edema.      Left lower leg: No edema.   Skin:     General: Skin is warm and dry.      Coloration: Skin is not pale.      Findings: No erythema or rash.   Neurological:      Mental Status: She is alert and oriented to person, place, and time.      Cranial Nerves: No cranial nerve deficit.   Psychiatric:         Behavior: Behavior normal.         Procedure   Procedures       Assessment/Plan     Problems Addressed this Visit        Cardiovascular and Mediastinum    Palpitations       Respiratory    Shortness of breath       Nervous and Auditory    Chest pain - Primary      Diagnoses       Codes Comments    Chest pain, unspecified type    -  Primary ICD-10-CM: R07.9  ICD-9-CM: 786.50     Palpitations     ICD-10-CM: R00.2  ICD-9-CM: 785.1     Shortness of breath     ICD-10-CM: R06.02  ICD-9-CM: 786.05           Recommendation  1.  Patient with atypical chest pain but continued symptoms requiring 2 emergency room visits.  I discussed results with the patient.  She feels she is doing better.  I discussed with her that we would call her in regards to her heart monitor to evaluate to make  sure that she does not have any significant arrhythmias.  However, she feels well.  She does not seem interested in any further evaluation.  I would consider anxiety or GI issues as potential causes but we will await event monitoring to ensure that there are no significant arrhythmias.  Beta-blocker therapy could be introduced in the future if this was the case of vitals will allow    2.  For now she feels better we will continue the same.  I did discuss with her that if symptoms were to worsen such as worsening chest pain, she is to contact our office and we could consider further, more invasive, cardiac evaluation if warranted.  Otherwise we will see her back for follow-up in a few months.  She is to follow with primary as scheduled           Charlotteairamtyra Florcaryn  reports that she has never smoked. She does not have any smokeless tobacco history on file..        Patient's Body mass index is 31.22 kg/m². BMI is above normal parameters. Recommendations include: educational material.       Electronically signed by:

## 2020-11-04 NOTE — PATIENT INSTRUCTIONS
How to Quarantine at Home  Information for Patients and Families    These instructions are for people with confirmed or suspected COVID-19 who do not need to be hospitalized and those with confirmed COVID-19 who were hospitalized and discharged to care for themselves at home.    If you were tested through the Health Department  The Health Department will monitor your wellbeing.  If it is determined that you do not need to be hospitalized and can be isolated at home, you will be monitored by staff from your local or state health department.     If you were tested through a Commercial Lab  You will need to monitor yourself and report changes in your symptoms to your doctor.  See the section below called Monitor Your Symptoms.    Follow these steps until a healthcare provider or local or state health department says you can return to your normal activities.    Stay home except to get medical care  • Restrict activities outside your home, except for getting medical care.   • Do not go to work, school, or public areas.   • Avoid using public transportation, ride-sharing, or taxis.    Separate yourself from other people and animals in your home  People  As much as possible, you should stay in a specific room and away from other people in your home. Also, you should use a separate bathroom, if available.    Animals  You should restrict contact with pets and other animals while you are sick with COVID-19, just like you would around other people. When possible, have another member of your household care for your animals while you are sick. If you are sick with COVID-19, avoid contact with your pet, including petting, snuggling, being kissed or licked, and sharing food. If you must care for your pet or be around animals while you are sick, wash your hands before and after you interact with pets and wear a facemask. See COVID-19 and Animals for more information.    Call ahead before visiting your doctor  If you have a medical  appointment, call the healthcare provider and tell them that you have or may have COVID-19. This information will help the healthcare provider’s office take steps to keep other people from getting infected or exposed.    Wear a facemask  You should wear a facemask when you are around other people (e.g., sharing a room or vehicle) or pets and before you enter a healthcare provider’s office.     If you are not able to wear a facemask (for example, because it causes trouble breathing), then people who live with you should not stay in the same room with you, or they should wear a facemask if they enter your room.    Cover your coughs and sneezes  • Cover your mouth and nose with a tissue when you cough or sneeze.   • Throw used tissues in a lined trash can.   • Immediately wash your hands with soap and water for at least 20 seconds or, if soap and water are not available, clean your hands with an alcohol-based hand  that contains at least 60% alcohol.    Clean your hands often  • Wash your hands often with soap and water for at least 20 seconds, especially after blowing your nose, coughing, or sneezing; going to the bathroom; and before eating or preparing food.     • If soap and water are not readily available, use an alcohol-based hand  with at least 60% alcohol, covering all surfaces of your hands and rubbing them together until they feel dry.    • Soap and water are the best option if hands are visibly dirty. Avoid touching your eyes, nose, and mouth with unwashed hands.    Avoid sharing personal household items  • You should not share dishes, drinking glasses, cups, eating utensils, towels, or bedding with other people or pets in your home.   • After using these items, they should be washed thoroughly with soap and water.    Clean all “high-touch” surfaces everyday  • High touch surfaces include counters, tabletops, doorknobs, bathroom fixtures, toilets, phones, keyboards, tablets, and bedside  tables.   • Also, clean any surfaces that may have blood, stool, or body fluids on them.   • Use a household cleaning spray or wipe, according to the label instructions. Labels contain instructions for safe and effective use of the cleaning product, including precautions you should take when applying the product, such as wearing gloves and making sure you have good ventilation during use of the product.    Monitor your symptoms  • Seek prompt medical attention if your illness is worsening (e.g., difficulty breathing).   • Before seeking care, call your healthcare provider and tell them that you have, or are being evaluated for, COVID-19.   • Put on a facemask before you enter the facility.     • These steps will help the healthcare provider’s office to keep other people in the office or waiting room from getting infected or exposed.   • Persons who are placed under active monitoring or facilitated self-monitoring should follow instructions provided by their local health department or occupational health professionals, as appropriate.  • If you have a medical emergency and need to call 911, notify the dispatch personnel that you have, or are being evaluated for COVID-19. If possible, put on a facemask before emergency medical services arrive.    Discontinuing home isolation  Patients with confirmed COVID-19 should remain under home isolation precautions until the risk of secondary transmission to others is thought to be low. The decision to discontinue home isolation precautions should be made on a case-by-case basis, in consultation with healthcare providers and state and local health departments.    The below content are for household members, intimate partners, and caregivers of a patient with symptomatic laboratory-confirmed COVID-19 or a patient under investigation:    Household members, intimate partners, and caregivers may have close contact with a person with symptomatic, laboratory-confirmed COVID-19 or a  person under investigation.     Close contacts should monitor their health; they should call their healthcare provider right away if they develop symptoms suggestive of COVID-19 (e.g., fever, cough, shortness of breath)     Close contacts should also follow these recommendations:  • Make sure that you understand and can help the patient follow their healthcare provider’s instructions for medication(s) and care. You should help the patient with basic needs in the home and provide support for getting groceries, prescriptions, and other personal needs.  • Monitor the patient’s symptoms. If the patient is getting sicker, call his or her healthcare provider and tell them that the patient has laboratory-confirmed COVID-19. This will help the healthcare provider’s office take steps to keep other people in the office or waiting room from getting infected. Ask the healthcare provider to call the local or [facility] health department for additional guidance. If the patient has a medical emergency and you need to call 911, notify the dispatch personnel that the patient has, or is being evaluated for COVID-19.  • Household members should stay in another room or be  from the patient as much as possible. Household members should use a separate bedroom and bathroom, if available.  • Prohibit visitors who do not have an essential need to be in the home.  • Household members should care for any pets in the home. Do not handle pets or other animals while sick.  For more information, see COVID-19 and Animals.  • Make sure that shared spaces in the home have good air flow, such as by an air conditioner or an opened window, weather permitting.  • Perform hand hygiene frequently. Wash your hands often with soap and water for at least 20 seconds or use an alcohol-based hand  that contains 60 to 95% alcohol, covering all surfaces of your hands and rubbing them together until they feel dry. Soap and water should be used  preferentially if hands are visibly dirty.  • Avoid touching your eyes, nose, and mouth with unwashed hands.  • The patient should wear a facemask when you are around other people. If the patient is not able to wear a facemask (for example, because it causes trouble breathing), you, as the caregiver, should wear a mask when you are in the same room as the patient.  • Wear a disposable facemask and gloves when you touch or have contact with the patient’s blood, stool, or body fluids, such as saliva, sputum, nasal mucus, vomit, or urine.   o Throw out disposable facemasks and gloves after using them. Do not reuse.  o When removing personal protective equipment, first remove and dispose of gloves. Then, immediately clean your hands with soap and water or alcohol-based hand . Next, remove and dispose of facemask, and immediately clean your hands again with soap and water or alcohol-based hand .  • Avoid sharing household items with the patient. You should not share dishes, drinking glasses, cups, eating utensils, towels, bedding, or other items. After the patient uses these items, you should wash them thoroughly (see below “Wash laundry thoroughly”).  • Clean all “high-touch” surfaces, such as counters, tabletops, doorknobs, bathroom fixtures, toilets, phones, keyboards, tablets, and bedside tables, every day. Also, clean any surfaces that may have blood, stool, or body fluids on them.   o Use a household cleaning spray or wipe, according to the label instructions. Labels contain instructions for safe and effective use of the cleaning product including precautions you should take when applying the product, such as wearing gloves and making sure you have good ventilation during use of the product.  • Wash laundry thoroughly.   o Immediately remove and wash clothes or bedding that have blood, stool, or body fluids on them.  o Wear disposable gloves while handling soiled items and keep soiled items away  from your body. Clean your hands (with soap and water or an alcohol-based hand ) immediately after removing your gloves.  o Read and follow directions on labels of laundry or clothing items and detergent. In general, using a normal laundry detergent according to washing machine instructions and dry thoroughly using the warmest temperatures recommended on the clothing label.  • Place all used disposable gloves, facemasks, and other contaminated items in a lined container before disposing of them with other household waste. Clean your hands (with soap and water or an alcohol-based hand ) immediately after handling these items. Soap and water should be used preferentially if hands are visibly dirty.  • Discuss any additional questions with your state or local health department or healthcare provider.    Adapted from information provided by the Centers for Disease Control and Prevention.  For more information, visit https://www.cdc.gov/coronavirus/2019-ncov/hcp/guidance-prevent-spread.htmlFat and Cholesterol Restricted Eating Plan  Getting too much fat and cholesterol in your diet may cause health problems. Choosing the right foods helps keep your fat and cholesterol at normal levels. This can keep you from getting certain diseases.  Your doctor may recommend an eating plan that includes:  · Total fat: ______% or less of total calories a day.  · Saturated fat: ______% or less of total calories a day.  · Cholesterol: less than _________mg a day.  · Fiber: ______g a day.  What are tips for following this plan?  Meal planning  · At meals, divide your plate into four equal parts:  ? Fill one-half of your plate with vegetables and green salads.  ? Fill one-fourth of your plate with whole grains.  ? Fill one-fourth of your plate with low-fat (lean) protein foods.  · Eat fish that is high in omega-3 fats at least two times a week. This includes mackerel, tuna, sardines, and salmon.  · Eat foods that are high  "in fiber, such as whole grains, beans, apples, broccoli, carrots, peas, and barley.  General tips    · Work with your doctor to lose weight if you need to.  · Avoid:  ? Foods with added sugar.  ? Fried foods.  ? Foods with partially hydrogenated oils.  · Limit alcohol intake to no more than 1 drink a day for nonpregnant women and 2 drinks a day for men. One drink equals 12 oz of beer, 5 oz of wine, or 1½ oz of hard liquor.  Reading food labels  · Check food labels for:  ? Trans fats.  ? Partially hydrogenated oils.  ? Saturated fat (g) in each serving.  ? Cholesterol (mg) in each serving.  ? Fiber (g) in each serving.  · Choose foods with healthy fats, such as:  ? Monounsaturated fats.  ? Polyunsaturated fats.  ? Omega-3 fats.  · Choose grain products that have whole grains. Look for the word \"whole\" as the first word in the ingredient list.  Cooking  · Cook foods using low-fat methods. These include baking, boiling, grilling, and broiling.  · Eat more home-cooked foods. Eat at restaurants and buffets less often.  · Avoid cooking using saturated fats, such as butter, cream, palm oil, palm kernel oil, and coconut oil.  Recommended foods    Fruits  · All fresh, canned (in natural juice), or frozen fruits.  Vegetables  · Fresh or frozen vegetables (raw, steamed, roasted, or grilled). Green salads.  Grains  · Whole grains, such as whole wheat or whole grain breads, crackers, cereals, and pasta. Unsweetened oatmeal, bulgur, barley, quinoa, or brown rice. Corn or whole wheat flour tortillas.  Meats and other protein foods  · Ground beef (85% or leaner), grass-fed beef, or beef trimmed of fat. Skinless chicken or turkey. Ground chicken or turkey. Pork trimmed of fat. All fish and seafood. Egg whites. Dried beans, peas, or lentils. Unsalted nuts or seeds. Unsalted canned beans. Nut butters without added sugar or oil.  Dairy  · Low-fat or nonfat dairy products, such as skim or 1% milk, 2% or reduced-fat cheeses, low-fat and " fat-free ricotta or cottage cheese, or plain low-fat and nonfat yogurt.  Fats and oils  · Tub margarine without trans fats. Light or reduced-fat mayonnaise and salad dressings. Avocado. Olive, canola, sesame, or safflower oils.  The items listed above may not be a complete list of foods and beverages you can eat. Contact a dietitian for more information.  Foods to avoid  Fruits  · Canned fruit in heavy syrup. Fruit in cream or butter sauce. Fried fruit.  Vegetables  · Vegetables cooked in cheese, cream, or butter sauce. Fried vegetables.  Grains  · White bread. White pasta. White rice. Cornbread. Bagels, pastries, and croissants. Crackers and snack foods that contain trans fat and hydrogenated oils.  Meats and other protein foods  · Fatty cuts of meat. Ribs, chicken wings, mcmahon, sausage, bologna, salami, chitterlings, fatback, hot dogs, bratwurst, and packaged lunch meats. Liver and organ meats. Whole eggs and egg yolks. Chicken and turkey with skin. Fried meat.  Dairy  · Whole or 2% milk, cream, half-and-half, and cream cheese. Whole milk cheeses. Whole-fat or sweetened yogurt. Full-fat cheeses. Nondairy creamers and whipped toppings. Processed cheese, cheese spreads, and cheese curds.  Beverages  · Alcohol. Sugar-sweetened drinks such as sodas, lemonade, and fruit drinks.  Fats and oils  · Butter, stick margarine, lard, shortening, ghee, or mcmahon fat. Coconut, palm kernel, and palm oils.  Sweets and desserts  · Corn syrup, sugars, honey, and molasses. Candy. Jam and jelly. Syrup. Sweetened cereals. Cookies, pies, cakes, donuts, muffins, and ice cream.  The items listed above may not be a complete list of foods and beverages you should avoid. Contact a dietitian for more information.  Summary  · Choosing the right foods helps keep your fat and cholesterol at normal levels. This can keep you from getting certain diseases.  · At meals, fill one-half of your plate with vegetables and green salads.  · Eat high-fiber  foods, like whole grains, beans, apples, carrots, peas, and barley.  · Limit added sugar, saturated fats, alcohol, and fried foods.  This information is not intended to replace advice given to you by your health care provider. Make sure you discuss any questions you have with your health care provider.  Document Released: 06/18/2013 Document Revised: 08/21/2019 Document Reviewed: 09/04/2018  [employer] Patient Education © 2020 [employer]  BMI for Adults  What is BMI?  Body mass index (BMI) is a number that is calculated from a person's weight and height. BMI can help estimate how much of a person's weight is composed of fat. BMI does not measure body fat directly. Rather, it is an alternative to procedures that directly measure body fat, which can be difficult and expensive.  BMI can help identify people who may be at higher risk for certain medical problems.  What are BMI measurements used for?  BMI is used as a screening tool to identify possible weight problems. It helps determine whether a person is obese, overweight, a healthy weight, or underweight.  BMI is useful for:  · Identifying a weight problem that may be related to a medical condition or may increase the risk for medical problems.  · Promoting changes, such as changes in diet and exercise, to help reach a healthy weight. BMI screening can be repeated to see if these changes are working.  How is BMI calculated?  BMI involves measuring your weight in relation to your height. Both height and weight are measured, and the BMI is calculated from those numbers. This can be done either in English ([place] or metric measurements. Note that charts and online BMI calculators are available to help you find your BMI quickly and easily without having to do these calculations yourself.  To calculate your BMI in English ([place] measurements:    1. Measure your weight in pounds (lb).  2. Multiply the number of pounds by 703.  ? For example, for a person who weighs 180  "lb, multiply that number by 703, which equals 126,540.  3. Measure your height in inches. Then multiply that number by itself to get a measurement called \"inches squared.\"  ? For example, for a person who is 70 inches tall, the \"inches squared\" measurement is 70 inches x 70 inches, which equals 4,900 inches squared.  4. Divide the total from step 2 (number of lb x 703) by the total from step 3 (inches squared): 126,540 ÷ 4,900 = 25.8. This is your BMI.  To calculate your BMI in metric measurements:  1. Measure your weight in kilograms (kg).  2. Measure your height in meters (m). Then multiply that number by itself to get a measurement called \"meters squared.\"  ? For example, for a person who is 1.75 m tall, the \"meters squared\" measurement is 1.75 m x 1.75 m, which is equal to 3.1 meters squared.  3. Divide the number of kilograms (your weight) by the meters squared number. In this example: 70 ÷ 3.1 = 22.6. This is your BMI.  What do the results mean?  BMI charts are used to identify whether you are underweight, normal weight, overweight, or obese. The following guidelines will be used:  · Underweight: BMI less than 18.5.  · Normal weight: BMI between 18.5 and 24.9.  · Overweight: BMI between 25 and 29.9.  · Obese: BMI of 30 or above.  Keep these notes in mind:  · Weight includes both fat and muscle, so someone with a muscular build, such as an athlete, may have a BMI that is higher than 24.9. In cases like these, BMI is not an accurate measure of body fat.  · To determine if excess body fat is the cause of a BMI of 25 or higher, further assessments may need to be done by a health care provider.  · BMI is usually interpreted in the same way for men and women.  Where to find more information  For more information about BMI, including tools to quickly calculate your BMI, go to these websites:  · Centers for Disease Control and Prevention: www.cdc.gov  · American Heart Association: www.heart.org  · National Heart, " Lung, and Blood [place]: www.nhlbi.nih.gov  Summary  · Body mass index (BMI) is a number that is calculated from a person's weight and height.  · BMI may help estimate how much of a person's weight is composed of fat. BMI can help identify those who may be at higher risk for certain medical problems.  · BMI can be measured using English measurements or metric measurements.  · BMI charts are used to identify whether you are underweight, normal weight, overweight, or obese.  This information is not intended to replace advice given to you by your health care provider. Make sure you discuss any questions you have with your health care provider.  Document Released: 08/29/2005 Document Revised: 09/09/2020 Document Reviewed: 07/17/2020  [employer] Patient Education © 2020 [employer]

## 2020-11-10 ENCOUNTER — TELEPHONE (OUTPATIENT)
Dept: CARDIOLOGY | Facility: CLINIC | Age: 45
End: 2020-11-10

## 2020-11-10 NOTE — TELEPHONE ENCOUNTER
----- Message from Tika Thomas sent at 11/10/2020  3:37 PM EST -----    ----- Message -----  From: Bharti Hyde  Sent: 11/10/2020   9:05 AM EST  To: Tika Thomas    Pt LVM requesting monitor results, would you obtain them, please?

## 2020-11-12 NOTE — TELEPHONE ENCOUNTER
Per Eric: no changes needed based on monitor.   Baseline HR 73.5 BPM. 0 critical, 0 serious, and 12 stable events.     Called pt and informed her of results. I did informed her that we may want to get another appt on the books, as we schedule a ways out.   Pt asked why she has tightness, I informed her that her test results and monitor were good, she verbalized understanding and ended call.

## 2020-12-03 DIAGNOSIS — R07.9 CHEST PAIN, UNSPECIFIED TYPE: Primary | ICD-10-CM

## 2020-12-03 DIAGNOSIS — R00.2 PALPITATIONS: ICD-10-CM

## 2020-12-03 DIAGNOSIS — R06.02 SHORTNESS OF BREATH: ICD-10-CM

## 2020-12-22 ENCOUNTER — APPOINTMENT (OUTPATIENT)
Dept: GENERAL RADIOLOGY | Facility: HOSPITAL | Age: 45
End: 2020-12-22

## 2020-12-22 ENCOUNTER — TELEPHONE (OUTPATIENT)
Dept: CARDIOLOGY | Facility: CLINIC | Age: 45
End: 2020-12-22

## 2020-12-22 ENCOUNTER — APPOINTMENT (OUTPATIENT)
Dept: CT IMAGING | Facility: HOSPITAL | Age: 45
End: 2020-12-22

## 2020-12-22 ENCOUNTER — HOSPITAL ENCOUNTER (EMERGENCY)
Facility: HOSPITAL | Age: 45
Discharge: HOME | End: 2020-12-22
Attending: EMERGENCY MEDICINE | Admitting: EMERGENCY MEDICINE

## 2020-12-22 VITALS
DIASTOLIC BLOOD PRESSURE: 91 MMHG | WEIGHT: 178 LBS | OXYGEN SATURATION: 99 % | RESPIRATION RATE: 16 BRPM | HEIGHT: 64 IN | TEMPERATURE: 98.2 F | HEART RATE: 86 BPM | BODY MASS INDEX: 30.39 KG/M2 | SYSTOLIC BLOOD PRESSURE: 124 MMHG

## 2020-12-22 DIAGNOSIS — R07.9 NONSPECIFIC CHEST PAIN: Primary | ICD-10-CM

## 2020-12-22 LAB
ALBUMIN SERPL-MCNC: 4.4 G/DL (ref 3.5–5.2)
ALBUMIN/GLOB SERPL: 1.3 G/DL
ALP SERPL-CCNC: 68 U/L (ref 39–117)
ALT SERPL W P-5'-P-CCNC: 13 U/L (ref 1–33)
ANION GAP SERPL CALCULATED.3IONS-SCNC: 12 MMOL/L (ref 5–15)
AST SERPL-CCNC: 11 U/L (ref 1–32)
B-HCG UR QL: NEGATIVE
BASOPHILS # BLD AUTO: 0.04 10*3/MM3 (ref 0–0.2)
BASOPHILS NFR BLD AUTO: 0.5 % (ref 0–1.5)
BILIRUB SERPL-MCNC: 0.4 MG/DL (ref 0–1.2)
BUN SERPL-MCNC: 11 MG/DL (ref 6–20)
BUN/CREAT SERPL: 14.1 (ref 7–25)
CALCIUM SPEC-SCNC: 9.3 MG/DL (ref 8.6–10.5)
CHLORIDE SERPL-SCNC: 102 MMOL/L (ref 98–107)
CO2 SERPL-SCNC: 26 MMOL/L (ref 22–29)
CREAT SERPL-MCNC: 0.78 MG/DL (ref 0.57–1)
DEPRECATED RDW RBC AUTO: 39.4 FL (ref 37–54)
EOSINOPHIL # BLD AUTO: 0.05 10*3/MM3 (ref 0–0.4)
EOSINOPHIL NFR BLD AUTO: 0.6 % (ref 0.3–6.2)
ERYTHROCYTE [DISTWIDTH] IN BLOOD BY AUTOMATED COUNT: 11.7 % (ref 12.3–15.4)
GFR SERPL CREATININE-BSD FRML MDRD: 80 ML/MIN/1.73
GLOBULIN UR ELPH-MCNC: 3.4 GM/DL
GLUCOSE SERPL-MCNC: 83 MG/DL (ref 65–99)
HCT VFR BLD AUTO: 43.7 % (ref 34–46.6)
HGB BLD-MCNC: 14.6 G/DL (ref 12–15.9)
HOLD SPECIMEN: NORMAL
HOLD SPECIMEN: NORMAL
IMM GRANULOCYTES # BLD AUTO: 0.02 10*3/MM3 (ref 0–0.05)
IMM GRANULOCYTES NFR BLD AUTO: 0.2 % (ref 0–0.5)
INTERNAL NEGATIVE CONTROL: NEGATIVE
INTERNAL POSITIVE CONTROL: POSITIVE
LIPASE SERPL-CCNC: 19 U/L (ref 13–60)
LYMPHOCYTES # BLD AUTO: 3.18 10*3/MM3 (ref 0.7–3.1)
LYMPHOCYTES NFR BLD AUTO: 37.5 % (ref 19.6–45.3)
Lab: NORMAL
MCH RBC QN AUTO: 30.5 PG (ref 26.6–33)
MCHC RBC AUTO-ENTMCNC: 33.4 G/DL (ref 31.5–35.7)
MCV RBC AUTO: 91.4 FL (ref 79–97)
MONOCYTES # BLD AUTO: 0.49 10*3/MM3 (ref 0.1–0.9)
MONOCYTES NFR BLD AUTO: 5.8 % (ref 5–12)
NEUTROPHILS NFR BLD AUTO: 4.71 10*3/MM3 (ref 1.7–7)
NEUTROPHILS NFR BLD AUTO: 55.4 % (ref 42.7–76)
NRBC BLD AUTO-RTO: 0 /100 WBC (ref 0–0.2)
NT-PROBNP SERPL-MCNC: 13.2 PG/ML (ref 0–450)
PLATELET # BLD AUTO: 269 10*3/MM3 (ref 140–450)
PMV BLD AUTO: 11.5 FL (ref 6–12)
POTASSIUM SERPL-SCNC: 3 MMOL/L (ref 3.5–5.2)
PROT SERPL-MCNC: 7.8 G/DL (ref 6–8.5)
QT INTERVAL: 382 MS
QTC INTERVAL: 462 MS
RBC # BLD AUTO: 4.78 10*6/MM3 (ref 3.77–5.28)
SODIUM SERPL-SCNC: 140 MMOL/L (ref 136–145)
TROPONIN T SERPL-MCNC: <0.01 NG/ML (ref 0–0.03)
TROPONIN T SERPL-MCNC: <0.01 NG/ML (ref 0–0.03)
WBC # BLD AUTO: 8.49 10*3/MM3 (ref 3.4–10.8)
WHOLE BLOOD HOLD SPECIMEN: NORMAL
WHOLE BLOOD HOLD SPECIMEN: NORMAL

## 2020-12-22 PROCEDURE — 81025 URINE PREGNANCY TEST: CPT | Performed by: EMERGENCY MEDICINE

## 2020-12-22 PROCEDURE — 99284 EMERGENCY DEPT VISIT MOD MDM: CPT

## 2020-12-22 PROCEDURE — 83690 ASSAY OF LIPASE: CPT

## 2020-12-22 PROCEDURE — 84484 ASSAY OF TROPONIN QUANT: CPT

## 2020-12-22 PROCEDURE — 85025 COMPLETE CBC W/AUTO DIFF WBC: CPT

## 2020-12-22 PROCEDURE — 93005 ELECTROCARDIOGRAM TRACING: CPT

## 2020-12-22 PROCEDURE — 71275 CT ANGIOGRAPHY CHEST: CPT

## 2020-12-22 PROCEDURE — 93005 ELECTROCARDIOGRAM TRACING: CPT | Performed by: EMERGENCY MEDICINE

## 2020-12-22 PROCEDURE — 71045 X-RAY EXAM CHEST 1 VIEW: CPT

## 2020-12-22 PROCEDURE — 84484 ASSAY OF TROPONIN QUANT: CPT | Performed by: EMERGENCY MEDICINE

## 2020-12-22 PROCEDURE — 80053 COMPREHEN METABOLIC PANEL: CPT

## 2020-12-22 PROCEDURE — 0 IOPAMIDOL PER 1 ML: Performed by: EMERGENCY MEDICINE

## 2020-12-22 PROCEDURE — 81025 URINE PREGNANCY TEST: CPT

## 2020-12-22 PROCEDURE — 83880 ASSAY OF NATRIURETIC PEPTIDE: CPT

## 2020-12-22 RX ORDER — SODIUM CHLORIDE 0.9 % (FLUSH) 0.9 %
10 SYRINGE (ML) INJECTION AS NEEDED
Status: DISCONTINUED | OUTPATIENT
Start: 2020-12-22 | End: 2020-12-22 | Stop reason: HOSPADM

## 2020-12-22 RX ORDER — ASPIRIN 81 MG/1
324 TABLET, CHEWABLE ORAL ONCE
Status: COMPLETED | OUTPATIENT
Start: 2020-12-22 | End: 2020-12-22

## 2020-12-22 RX ADMIN — ASPIRIN 81 MG CHEWABLE TABLET 324 MG: 81 TABLET CHEWABLE at 18:20

## 2020-12-22 RX ADMIN — IOPAMIDOL 80 ML: 755 INJECTION, SOLUTION INTRAVENOUS at 20:06

## 2020-12-22 NOTE — TELEPHONE ENCOUNTER
Pt called office, I informed her of the [employer] message we sent her.   She stated that she called her PCP and they want her to start Metoprolol. She stated she would go to ER due to worsening sx.

## 2020-12-23 ENCOUNTER — TELEPHONE (OUTPATIENT)
Dept: CARDIOLOGY | Facility: CLINIC | Age: 45
End: 2020-12-23

## 2020-12-23 NOTE — DISCHARGE INSTRUCTIONS
No referred you to heart valve clinic here at [facility].  If you do not hear back from them by tomorrow evening call to schedule an appointment.    Continue nitroglycerin as previously prescribed.  Continue Pepcid as previously prescribed.    Please review the medications you are supposed to be taking according to prior physician recommendations. I have not changed your home medications during this visit. If your discharge instructions indicate that I have changed your home medications, this is not the case, and you should disregard. If you have any questions about the medication you should be taking at home, please call your physician.

## 2020-12-23 NOTE — TELEPHONE ENCOUNTER
----- Message from Eva Saleem sent at 12/23/2020  3:25 PM EST -----  Regarding: RE: Non-Urgent Medical Question  Contact: [phone]  My  and I decided to make the drive to [facility] in [place] instead of visiting the local ER, on your suggestion.  They did not find anything that suggested that I was having any active heart issues.  They referred me to the [facility] in [place] and I have an appt with them January 5th.  They suggested that I may want to visit a gastroenterologist to see if my that may be my issue.  My GP had recently mentioned that  as well so I have reached out to him for a referral.    My GP also started me on Prilosec a few weeks ago thinking it may be a GI issue.  My GP had also mentioned that I may want to start taking Metoprolol Tartrate, 25 MG.  Is this suggested?  The Drug info is a little scary when I read it.   Pt states needs a referral before they will schedule a appointment. Advised pt will put referral in today

## 2020-12-23 NOTE — ED PROVIDER NOTES
EMERGENCY DEPARTMENT ENCOUNTER    Room Number:  09/09  Date of encounter:  12/22/2020  PCP: James Barker MD  Historian: Patient      HPI:  Chief Complaint: Chest pain        Context: Eva Saleem is a 45 y.o. female who presents to the ED c/o intermittent chest pain over the last few months.  Patient is undergone cardiac testing and no underlying etiology has been found for her chest discomfort.  Today she developed a sharp sensation in the center of her chest which is different from her usual chest pain.  She was already scheduled for a CTA of the chest for next month but became concerned that more could be going on and presented to the emergency department.  She reports the discomfort is almost completely gone at this point.  She noted the discomfort in the central portion of her chest that was nonradiating.  She did take nitroglycerin both today as well as 3 days ago.  Both times she notes discomfort relief within 20 minutes or so of ingesting it.  She denies diaphoresis, nausea, vomiting.  Positive history of hypertension.  No history of  hypercholesterolemia, diabetes, tobacco, recreational drug use, obesity.  No early cardiac disease in family.    PAST MEDICAL HISTORY  Active Ambulatory Problems     Diagnosis Date Noted   • Shortness of breath 10/06/2020   • Palpitations 10/06/2020   • Chest pain 10/06/2020     Resolved Ambulatory Problems     Diagnosis Date Noted   • No Resolved Ambulatory Problems     No Additional Past Medical History         PAST SURGICAL HISTORY  History reviewed. No pertinent surgical history.      FAMILY HISTORY  Family History   Problem Relation Age of Onset   • Lymphoma Mother    • Hypertension Father    • Diabetes Father    • Gout Sister    • Diabetes Brother          SOCIAL HISTORY  Social History     Socioeconomic History   • Marital status:      Spouse name: Not on file   • Number of children: Not on file   • Years of education: Not on file   • Highest education  level: Not on file   Tobacco Use   • Smoking status: Never Smoker   Substance and Sexual Activity   • Alcohol use: Not Currently     Frequency: Never   • Drug use: Never   • Sexual activity: Defer         ALLERGIES  Patient has no known allergies.        REVIEW OF SYSTEMS  Review of Systems     All systems reviewed and negative except for those discussed in HPI.       PHYSICAL EXAM    I have reviewed the triage vital signs and nursing notes.    ED Triage Vitals [12/22/20 1713]   Temp Heart Rate Resp BP SpO2   96.6 °F (35.9 °C) 91 18 144/83 100 %      Temp src Heart Rate Source Patient Position BP Location FiO2 (%)   Infrared Monitor Sitting Left arm --       Physical Exam  GENERAL:   Appears pleasant and nontoxic.  No acute distress..  HENT: Nares patent.  EYES: No scleral icterus.  CV: Regular rhythm, regular rate.  No murmurs gallops rubs.  Clear to auscultation.  RESPIRATORY: Normal effort.  No audible wheezes, rales or rhonchi.  ABDOMEN: Soft, nontender.  MUSCULOSKELETAL: No deformities.  No reproduction of chest discomfort with palpation.  NEURO: Alert, moves all extremities, follows commands.  SKIN: Warm, dry, no rash visualized.        LAB RESULTS  Recent Results (from the past 24 hour(s))   ECG 12 Lead    Collection Time: 12/22/20  5:22 PM   Result Value Ref Range    QT Interval 382 ms    QTC Interval 462 ms   Troponin    Collection Time: 12/22/20  5:25 PM    Specimen: Blood   Result Value Ref Range    Troponin T <0.010 0.000 - 0.030 ng/mL   Comprehensive Metabolic Panel    Collection Time: 12/22/20  5:25 PM    Specimen: Blood   Result Value Ref Range    Glucose 83 65 - 99 mg/dL    BUN 11 6 - 20 mg/dL    Creatinine 0.78 0.57 - 1.00 mg/dL    Sodium 140 136 - 145 mmol/L    Potassium 3.0 (L) 3.5 - 5.2 mmol/L    Chloride 102 98 - 107 mmol/L    CO2 26.0 22.0 - 29.0 mmol/L    Calcium 9.3 8.6 - 10.5 mg/dL    Total Protein 7.8 6.0 - 8.5 g/dL    Albumin 4.40 3.50 - 5.20 g/dL    ALT (SGPT) 13 1 - 33 U/L    AST (SGOT)  11 1 - 32 U/L    Alkaline Phosphatase 68 39 - 117 U/L    Total Bilirubin 0.4 0.0 - 1.2 mg/dL    eGFR Non African Amer 80 >60 mL/min/1.73    Globulin 3.4 gm/dL    A/G Ratio 1.3 g/dL    BUN/Creatinine Ratio 14.1 7.0 - 25.0    Anion Gap 12.0 5.0 - 15.0 mmol/L   Lipase    Collection Time: 12/22/20  5:25 PM    Specimen: Blood   Result Value Ref Range    Lipase 19 13 - 60 U/L   BNP    Collection Time: 12/22/20  5:25 PM    Specimen: Blood   Result Value Ref Range    proBNP 13.2 0.0 - 450.0 pg/mL   Light Blue Top    Collection Time: 12/22/20  5:25 PM   Result Value Ref Range    Extra Tube hold for add-on    Green Top (Gel)    Collection Time: 12/22/20  5:25 PM   Result Value Ref Range    Extra Tube Hold for add-ons.    Lavender Top    Collection Time: 12/22/20  5:25 PM   Result Value Ref Range    Extra Tube hold for add-on    Gold Top - SST    Collection Time: 12/22/20  5:25 PM   Result Value Ref Range    Extra Tube Hold for add-ons.    CBC Auto Differential    Collection Time: 12/22/20  5:25 PM    Specimen: Blood   Result Value Ref Range    WBC 8.49 3.40 - 10.80 10*3/mm3    RBC 4.78 3.77 - 5.28 10*6/mm3    Hemoglobin 14.6 12.0 - 15.9 g/dL    Hematocrit 43.7 34.0 - 46.6 %    MCV 91.4 79.0 - 97.0 fL    MCH 30.5 26.6 - 33.0 pg    MCHC 33.4 31.5 - 35.7 g/dL    RDW 11.7 (L) 12.3 - 15.4 %    RDW-SD 39.4 37.0 - 54.0 fl    MPV 11.5 6.0 - 12.0 fL    Platelets 269 140 - 450 10*3/mm3    Neutrophil % 55.4 42.7 - 76.0 %    Lymphocyte % 37.5 19.6 - 45.3 %    Monocyte % 5.8 5.0 - 12.0 %    Eosinophil % 0.6 0.3 - 6.2 %    Basophil % 0.5 0.0 - 1.5 %    Immature Grans % 0.2 0.0 - 0.5 %    Neutrophils, Absolute 4.71 1.70 - 7.00 10*3/mm3    Lymphocytes, Absolute 3.18 (H) 0.70 - 3.10 10*3/mm3    Monocytes, Absolute 0.49 0.10 - 0.90 10*3/mm3    Eosinophils, Absolute 0.05 0.00 - 0.40 10*3/mm3    Basophils, Absolute 0.04 0.00 - 0.20 10*3/mm3    Immature Grans, Absolute 0.02 0.00 - 0.05 10*3/mm3    nRBC 0.0 0.0 - 0.2 /100 WBC   POCT pregnancy,  urine    Collection Time: 12/22/20  6:11 PM    Specimen: Urine   Result Value Ref Range    HCG, Urine, QL Negative Negative    Lot Number [id number]     Internal Positive Control Positive     Internal Negative Control Negative    Troponin    Collection Time: 12/22/20  7:22 PM    Specimen: Blood   Result Value Ref Range    Troponin T <0.010 0.000 - 0.030 ng/mL       If labs were ordered, I independently reviewed the results.        RADIOLOGY  Xr Chest 1 View    Result Date: 12/22/2020   EXAMINATION: XR CHEST 1 VW-  INDICATION: Chest Pain triage protocol  COMPARISON: NONE  FINDINGS: Single AP view the chest shows cardiac size within normal limits. Pulmonary vascularity within normal limits. No focal opacification or consolidation. No pneumothorax or pleural effusion. Degenerative changes of the spine.         No acute cardiopulmonary process.       Ct Angiogram Chest    Result Date: 12/22/2020  CTA Chest INDICATION: Chest pain TECHNIQUE: CT angiogram of the chest with IV contrast. 3-D reconstructions were obtained and reviewed.   Radiation dose reduction techniques included automated exposure control or exposure modulation based on body size. Count of known CT and cardiac nuc med studies performed in previous 12 months: 0. COMPARISON: None available. FINDINGS: Lung fields are clear apart from a granuloma in the anterior left lung field. No evidence of pulmonary embolism. The aorta appears unremarkable. Upper abdominal structures appear within normal limits. No acute osseous abnormality. If positive, report RV/LV ratio if >.0.9     1. Negative for pulmonary embolus. 2. No acute findings in the chest. Signer Name: Susan Roberts MD  Signed: 12/22/2020 8:23 PM  Workstation Name: [id number]  [facility]      I ordered and reviewed the above noted radiographic studies.    I viewed images of chest CT which showed no pulmonary emboli per my independent interpretation.  See radiologist's dictation for  official interpretation.        PROCEDURES    Procedures    ECG 12 Lead   Final Result   Test Reason : chest pain   Blood Pressure : **/** mmHG   Vent. Rate : 088 BPM     Atrial Rate : 088 BPM      P-R Int : 180 ms          QRS Dur : 096 ms       QT Int : 382 ms       P-R-T Axes : 022 083 013 degrees      QTc Int : 462 ms      Normal sinus rhythm   Normal ECG   When compared with ECG of 22-DEC-2020 17:19, (Unconfirmed)   Previous ECG has undetermined rhythm, needs review   Confirmed by KAT ALEX MD ([id number]) on 12/22/2020 7:23:59 PM      Referred By:  EDMD           Confirmed By:KAT ALEX MD      ECG 12 Lead             MEDICATIONS GIVEN IN ER    Medications   sodium chloride 0.9 % flush 10 mL (has no administration in time range)   aspirin chewable tablet 324 mg (324 mg Oral Given 12/22/20 1820)   iopamidol (ISOVUE-370) 76 % injection 100 mL (80 mL Intravenous Given 12/22/20 2006)         PROGRESS, DATA ANALYSIS, CONSULTS, AND MEDICAL DECISION MAKING    All labs have been independently reviewed by me.  All radiology studies have been reviewed by me and the radiologist dictating the report.   EKG's have been independently viewed and interpreted by me.      Differential diagnoses: Chest wall strain versus costochondritis versus pulmonary embolus versus acute coronary syndrome, etc.      ED Course as of Dec 22 2114   Tue Dec 22, 2020   2113 Reviewed multiple records on this patient.  She has undergone and had relatively benign results for exercise stress test, echocardiogram, cardiac monitoring.  She has not undergone cardiac catheterization to this point.    [MS]      ED Course User Index  [MS] Kat Alex MD             AS OF 21:14 EST VITALS:    BP - (!) 133/103  HR - 81  TEMP - 98.2 °F (36.8 °C)  O2 SATS - 99%        DIAGNOSIS  Final diagnoses:   Nonspecific chest pain         DISPOSITION  DISCHARGE    FOLLOW-UP  James Barker MD  [address] [place], [place] [id number] [phone]      NEXT  AVAILABLE APPOINTMENT - RECHECK OF CONDITION    [facility]  [address]  [facility] [address] [phone]        [facility]  [address]  [facility] [address] [phone]             Medication List      Stop    lisinopril 10 MG tablet  Commonly known as: PRINIVIL,ZESTRIL                     Kamran Underwood MD  12/22/20 6226

## 2021-01-04 NOTE — PROGRESS NOTES
Chief Complaint  Chest Pain and Establish Care    Subjective    History of Present Illness {CC  Problem List  Visit  Diagnosis   Encounters  Notes  Medications  Labs  Result Review Imaging  Media :23}     Eva Saleem presents to [facility] for chest pain  History of Present Illness   45-year-old female who presents today for evaluation of chest pain at the request of .  Patient presented the ED on 12/22 with a several month history of chest pain.  Patient has been evaluated by cardiology in [place] and has had a normal exercise stress test and normal echo.  She also wore a cardiac event monitor, which was negative for arrhythmias.  On the day of the ED visit she had a sharp sensations in her chest which was different than her usual chest pain.  She has tried nitroglycerin and reports some relief within 5 minutes or so of taking it.  She has no history of hyperlipidemia, diabetes, tobacco abuse or premature family history of cardiac disease.  Work-up in ED including CTA of the chest was negative.  Labs unremarkable except for hypokalemia.EKGs normal.   She reports that her chest pain occurs 1-2 times a week and typically feels a tightness and sometimes will feel numbness and tingling in her hands and feet. She saw GI yesterday and plans to have an EGD and colonoscopy in the future. She has been to the ED 3 times since December for chest discomfort and always has a normal cardiac evaluation. She never has exertional chest pain, but will feel her heart speed up at times. Occasional associated shortness of breath, but not consistent. She notes associated belching, heartburn, poor appetite, loose stools/constipation. Chest pain is worse with certain foods    Cardiac risks: HTN, obesity  Objective     Vital Signs:   Vitals:    01/05/21 1554 01/05/21 1555 01/05/21 1557   BP: 133/89 119/74 113/74   BP Location: Right arm Left arm Left arm   Patient Position:  "Sitting Sitting Standing   Cuff Size: Adult Adult Adult   Pulse: 85 87 101   Resp:   22   Temp:   97.7 °F (36.5 °C)   TempSrc:   Temporal   SpO2: 100% 99% 100%   Weight:   77.6 kg (171 lb)   Height:   162.6 cm (64\")     Body mass index is 29.35 kg/m².    Physical Exam  Vitals signs reviewed.   Constitutional:       Appearance: Normal appearance.   HENT:      Head: Normocephalic.   Eyes:      Extraocular Movements: Extraocular movements intact.      Pupils: Pupils are equal, round, and reactive to light.   Neck:      Musculoskeletal: Neck supple.      Vascular: No carotid bruit.   Cardiovascular:      Rate and Rhythm: Normal rate and regular rhythm.      Pulses: Normal pulses.      Heart sounds: Normal heart sounds. No murmur.   Pulmonary:      Effort: Pulmonary effort is normal. No respiratory distress.      Breath sounds: Normal breath sounds.   Chest:      Chest wall: No tenderness.   Abdominal:      General: Abdomen is flat.      Palpations: Abdomen is soft.   Musculoskeletal:      Right lower leg: No edema.      Left lower leg: No edema.   Skin:     General: Skin is warm and dry.   Neurological:      General: No focal deficit present.      Mental Status: She is alert and oriented to person, place, and time. Mental status is at baseline.   Psychiatric:         Mood and Affect: Mood normal.         Behavior: Behavior normal.         Thought Content: Thought content normal.              Result Review  Data Reviewed:{ Labs  Result Review  Imaging  Med Tab  Media :23}   The following data was reviewed by: ISMA Flores on 01/05/2021      Radiologic studies CT, CXR, Cardiology studies stress test, echo, event monitor, EKGs, Consultant notes cardiology, ED and Recent hospitalization notes ED   ECG 12 Lead (12/22/2020 17:22)  ECG 12 Lead (12/22/2020 17:19)  Troponin (12/22/2020 19:22)  POCT pregnancy, urine (12/22/2020 18:11)  BNP (12/22/2020 17:25)  Lipase (12/22/2020 17:25)  Comprehensive Metabolic " Panel (12/22/2020 17:25)  CBC & Differential (12/22/2020 17:25)  Troponin (12/22/2020 17:25)  XR Chest 1 View (12/22/2020 17:45)  CT Angiogram Chest (12/22/2020 20:13)  Echo 10/13/20  1.  LV size, function, wall motion, and wall thickness are normal.  Visually estimated ejection fraction is 55%.  Normal LV diastolic function and filling pressures.  The atria and right ventricle are normal.  No septal defect or intracavitary mass or thrombus.     2.  Valves are morphologically and physiologically normal with no stenotic lesions, valve associated masses or thrombi, or hemodynamically significant regurgitation.     3.  No pericardial or great vessel pathology.     4.  Pulmonary artery pressures cannot be calculated but are likely not significantly elevated.    GXT 10/13/2020  1.  Poor functional capacity without chest pain.  The patient requested the study be discontinued because of shortness of breath.  Adequate heart rate was achieved.     2.  Physiologic heart rate and blood pressure responses to exercise.     3.  No EKG evidence of ischemia.     4.  No exercise-induced dysrhythmia.         Assessment and Plan {CC Problem List  Visit Diagnosis  ROS  Review (Popup)  Health Maintenance  Quality  BestPractice  Medications  SmartSets  SnapShot Encounters  Media :23}   1. Chest pain, unspecified type  Normal GXT, echo and monitor. 3 normal ED evaluations. She is scheduled for EGD/colonoscopy Tuesday, which I think is important since she does have multiple GI issues which contribute. Overall, she is low ASCVD risk. She is scheduled for coronary CTA on 1/14 and I have encouraged her to keep this appt    2. Essential hypertension  Well controlled  Continue hydrochlorothiazide    3. Palpitations  No arrhythmias on holter monitor. She notes some tachycardia with exercise, which could be related to deconditioning    Advised patient to keep her follow up with [facility] and call if she has any further  concerns          Follow Up {Instructions Charge Capture  Follow-up Communications :23}   Return if symptoms worsen or fail to improve.    Patient was given instructions and counseling regarding her condition or for health maintenance advice. Please see specific information pulled into the AVS if appropriate.  Patient was instructed to call the [facility] with any questions, concerns, or worsening symptoms.    *Please note that portions of this note were completed with a voice recognition program. Efforts were made to edit the dictations, but occasionally words are mistranscribed.

## 2021-01-05 ENCOUNTER — OFFICE VISIT (OUTPATIENT)
Dept: CARDIOLOGY | Facility: HOSPITAL | Age: 46
End: 2021-01-05

## 2021-01-05 VITALS
OXYGEN SATURATION: 100 % | WEIGHT: 171 LBS | HEIGHT: 64 IN | TEMPERATURE: 97.7 F | SYSTOLIC BLOOD PRESSURE: 113 MMHG | BODY MASS INDEX: 29.19 KG/M2 | HEART RATE: 101 BPM | DIASTOLIC BLOOD PRESSURE: 74 MMHG | RESPIRATION RATE: 22 BRPM

## 2021-01-05 DIAGNOSIS — R00.2 PALPITATIONS: ICD-10-CM

## 2021-01-05 DIAGNOSIS — I10 ESSENTIAL HYPERTENSION: ICD-10-CM

## 2021-01-05 DIAGNOSIS — R07.9 CHEST PAIN, UNSPECIFIED TYPE: Primary | ICD-10-CM

## 2021-01-05 PROCEDURE — 99214 OFFICE O/P EST MOD 30 MIN: CPT | Performed by: NURSE PRACTITIONER

## 2021-01-14 ENCOUNTER — HOSPITAL ENCOUNTER (OUTPATIENT)
Dept: CT IMAGING | Facility: HOSPITAL | Age: 46
Discharge: HOME | End: 2021-01-14
Admitting: PHYSICIAN ASSISTANT

## 2021-01-14 VITALS
HEIGHT: 64 IN | BODY MASS INDEX: 29.47 KG/M2 | RESPIRATION RATE: 18 BRPM | OXYGEN SATURATION: 99 % | WEIGHT: 172.6 LBS | SYSTOLIC BLOOD PRESSURE: 118 MMHG | HEART RATE: 56 BPM | DIASTOLIC BLOOD PRESSURE: 74 MMHG | TEMPERATURE: 97.1 F

## 2021-01-14 DIAGNOSIS — R00.2 PALPITATIONS: ICD-10-CM

## 2021-01-14 DIAGNOSIS — R06.02 SHORTNESS OF BREATH: ICD-10-CM

## 2021-01-14 DIAGNOSIS — R07.9 CHEST PAIN, UNSPECIFIED TYPE: ICD-10-CM

## 2021-01-14 LAB — CREAT BLDA-MCNC: 0.8 MG/DL (ref 0.6–1.3)

## 2021-01-14 PROCEDURE — 82565 ASSAY OF CREATININE: CPT

## 2021-01-14 PROCEDURE — 75574 CT ANGIO HRT W/3D IMAGE: CPT

## 2021-01-14 PROCEDURE — 0 IOPAMIDOL PER 1 ML: Performed by: PHYSICIAN ASSISTANT

## 2021-01-14 RX ORDER — LIDOCAINE HYDROCHLORIDE 10 MG/ML
5 INJECTION, SOLUTION EPIDURAL; INFILTRATION; INTRACAUDAL; PERINEURAL AS NEEDED
Status: DISCONTINUED | OUTPATIENT
Start: 2021-01-14 | End: 2021-01-15 | Stop reason: HOSPADM

## 2021-01-14 RX ORDER — METOPROLOL TARTRATE 50 MG/1
50 TABLET, FILM COATED ORAL ONCE AS NEEDED
Status: COMPLETED | OUTPATIENT
Start: 2021-01-14 | End: 2021-01-14

## 2021-01-14 RX ORDER — NITROGLYCERIN 0.4 MG/1
TABLET SUBLINGUAL
Status: COMPLETED
Start: 2021-01-14 | End: 2021-01-14

## 2021-01-14 RX ORDER — NITROGLYCERIN 0.4 MG/1
0.4 TABLET SUBLINGUAL
Status: COMPLETED | OUTPATIENT
Start: 2021-01-14 | End: 2021-01-14

## 2021-01-14 RX ORDER — METOPROLOL TARTRATE 50 MG/1
100 TABLET, FILM COATED ORAL ONCE AS NEEDED
Status: COMPLETED | OUTPATIENT
Start: 2021-01-14 | End: 2021-01-14

## 2021-01-14 RX ORDER — SODIUM CHLORIDE 0.9 % (FLUSH) 0.9 %
3 SYRINGE (ML) INJECTION EVERY 12 HOURS SCHEDULED
Status: DISCONTINUED | OUTPATIENT
Start: 2021-01-14 | End: 2021-01-15 | Stop reason: HOSPADM

## 2021-01-14 RX ADMIN — IOPAMIDOL 65 ML: 755 INJECTION, SOLUTION INTRAVENOUS at 12:18

## 2021-01-14 RX ADMIN — METOPROLOL TARTRATE 100 MG: 50 TABLET, FILM COATED ORAL at 10:46

## 2021-01-14 RX ADMIN — NITROGLYCERIN 0.4 MG: 0.4 TABLET SUBLINGUAL at 12:29

## 2021-01-14 NOTE — DISCHARGE INSTRUCTIONS
MAKE SURE TO DRINK PLENTY OF FLUIDS FOR THE NEXT 48 HOURS TO FLUSH THE CONTRAST THROUGH YOUR KIDNEYS.

## 2021-01-14 NOTE — NURSING NOTE
Pt discharged from ir dept s/p Cardiac CTA.  Pt tolerated procedure without complications.  Discharge instructions reviewed with patient who verbalized understanding.  Pt left unit ambulatory.

## 2021-01-18 ENCOUNTER — TELEPHONE (OUTPATIENT)
Dept: CARDIOLOGY | Facility: CLINIC | Age: 46
End: 2021-01-18

## 2021-01-18 NOTE — TELEPHONE ENCOUNTER
----- Message from Batool Feldman sent at 1/18/2021  9:40 AM EST -----    ----- Message -----  From: Eric Pham PA  Sent: 1/18/2021   9:05 AM EST  To: Batool Feldman    Routine follow-up.  Please let patient know the results

## 2021-01-18 NOTE — TELEPHONE ENCOUNTER
CTA Heart results look good overall, keep appt on 2/16/21 at 11:15am.       Called and informed pt of the above, she verbalized understanding.

## 2021-03-02 ENCOUNTER — OFFICE VISIT (OUTPATIENT)
Dept: CARDIOLOGY | Facility: CLINIC | Age: 46
End: 2021-03-02

## 2021-03-02 VITALS
WEIGHT: 170 LBS | BODY MASS INDEX: 29.02 KG/M2 | HEART RATE: 88 BPM | HEIGHT: 64 IN | SYSTOLIC BLOOD PRESSURE: 127 MMHG | OXYGEN SATURATION: 100 % | DIASTOLIC BLOOD PRESSURE: 79 MMHG

## 2021-03-02 DIAGNOSIS — R00.2 PALPITATIONS: Primary | ICD-10-CM

## 2021-03-02 DIAGNOSIS — R06.02 SHORTNESS OF BREATH: ICD-10-CM

## 2021-03-02 DIAGNOSIS — R07.9 CHEST PAIN, UNSPECIFIED TYPE: ICD-10-CM

## 2021-03-02 PROCEDURE — 99214 OFFICE O/P EST MOD 30 MIN: CPT | Performed by: PHYSICIAN ASSISTANT

## 2021-03-02 RX ORDER — FAMOTIDINE 20 MG/1
20 TABLET, FILM COATED ORAL 2 TIMES DAILY
COMMUNITY
End: 2022-03-02

## 2021-03-02 NOTE — PROGRESS NOTES
Problem list     Subjective   Eva Saleem is a 46 y.o. female     Chief Complaint   Patient presents with   • Follow-up       HPI    Patient is a 46-year-old female that presents to the office to be evaluated.    .Patient initially presented to the office with complaints of chest discomfort after having an emergency room visit.  She would feel pressure substernally and she would have palpitations and shortness of breath.  This is something that occurred in prompted cardiac referral.  We ordered testing to include regular treadmill stress test which was normal.  She had echocardiogram which demonstrated good LV function with no significant structural disease or valvular heart disease.  Event monitor demonstrated premature beats with PACs and PVCs but no other arrhythmia identified    She has done better.  She describes being diagnosed with GERD.  She feels it is related to some of her symptoms as a tightness she will get in her chest.  She does not describe any progressive pain or change in symptoms.  Her dyspnea is mild.  She does not describe any progressive shortness of breath.  No PND orthopnea.    She occasionally may have palpitations.  She does not describe any dizziness presyncope or syncope.  She otherwise is feeling stable at this point.  She does not describe any progressive symptoms and for now is doing well      Current Outpatient Medications on File Prior to Visit   Medication Sig Dispense Refill   • acetaminophen (TYLENOL) 500 MG tablet Take 500 mg by mouth Every 6 (Six) Hours As Needed for Mild Pain .     • aspirin 81 MG chewable tablet Chew 81 mg Every Night.     • famotidine (PEPCID) 20 MG tablet Take 20 mg by mouth 2 (Two) Times a Day.     • hydroCHLOROthiazide (HYDRODIURIL) 25 MG tablet Take 25 mg by mouth Every Night.     • ibuprofen (ADVIL,MOTRIN) 600 MG tablet Take 600 mg by mouth Every 6 (Six) Hours As Needed for Mild Pain .     • Loratadine-Pseudoephedrine (CLARITIN-D 24 HOUR PO) Take  by  mouth As Needed.     • nitroglycerin (NITROSTAT) 0.4 MG SL tablet 1 under the tongue as needed for angina, may repeat q5mins for up three doses 25 tablet 11   • NON FORMULARY Female tonic- 3 tsp BID      • [DISCONTINUED] lisinopril (PRINIVIL,ZESTRIL) 10 MG tablet Take  by mouth Daily.       No current facility-administered medications on file prior to visit.        Patient has no known allergies.    Past Medical History:   Diagnosis Date   • GERD (gastroesophageal reflux disease)    • GERD (gastroesophageal reflux disease)    • HTN (hypertension)    • Seasonal allergies        Social History     Socioeconomic History   • Marital status:      Spouse name: Not on file   • Number of children: Not on file   • Years of education: Not on file   • Highest education level: Not on file   Tobacco Use   • Smoking status: Never Smoker   • Smokeless tobacco: Never Used   Substance and Sexual Activity   • Alcohol use: Not Currently     Frequency: Never   • Drug use: Never   • Sexual activity: Defer   Social History Narrative    Caffeine 0       Family History   Problem Relation Age of Onset   • Lymphoma Mother    • Hypertension Father    • Diabetes Father    • Gout Sister    • Other Sister         gout   • Diabetes Brother    • No Known Problems Maternal Grandmother    • No Known Problems Maternal Grandfather    • No Known Problems Paternal Grandmother    • No Known Problems Paternal Grandfather        Review of Systems   Constitutional: Negative.    HENT: Negative.    Eyes: Positive for visual disturbance (reading glasses).   Respiratory: Positive for shortness of breath. Negative for apnea and chest tightness.    Cardiovascular: Positive for chest pain and palpitations. Negative for leg swelling.   Gastrointestinal: Negative.    Endocrine: Negative.    Genitourinary: Negative.    Musculoskeletal: Positive for neck pain (right side of neck). Negative for back pain, gait problem and neck stiffness.   Skin: Negative.   "Negative for rash and wound.   Allergic/Immunologic: Positive for environmental allergies (seasonal allergies). Negative for food allergies.   Neurological: Negative.  Negative for dizziness, weakness, light-headedness, numbness and headaches.   Hematological: Bruises/bleeds easily (bruise).   Psychiatric/Behavioral: Negative for sleep disturbance.       Objective   Vitals:    03/02/21 1406   BP: 127/79   BP Location: Left arm   Patient Position: Sitting   Pulse: 88   SpO2: 100%   Weight: 77.1 kg (170 lb)   Height: 162.6 cm (64.02\")      /79 (BP Location: Left arm, Patient Position: Sitting)   Pulse 88   Ht 162.6 cm (64.02\")   Wt 77.1 kg (170 lb)   SpO2 100%   BMI 29.17 kg/m²     Lab Results (most recent)     None          Physical Exam  Vitals signs and nursing note reviewed.   Constitutional:       General: She is not in acute distress.     Appearance: Normal appearance. She is well-developed.   HENT:      Head: Normocephalic and atraumatic.   Eyes:      General: No scleral icterus.        Right eye: No discharge.         Left eye: No discharge.      Conjunctiva/sclera: Conjunctivae normal.   Neck:      Vascular: No carotid bruit.   Cardiovascular:      Rate and Rhythm: Normal rate and regular rhythm.      Heart sounds: Normal heart sounds. No murmur. No friction rub. No gallop.    Pulmonary:      Effort: Pulmonary effort is normal. No respiratory distress.      Breath sounds: Normal breath sounds. No wheezing or rales.   Chest:      Chest wall: No tenderness.   Musculoskeletal:      Right lower leg: No edema.      Left lower leg: No edema.   Skin:     General: Skin is warm and dry.      Coloration: Skin is not pale.      Findings: No erythema or rash.   Neurological:      Mental Status: She is alert and oriented to person, place, and time.      Cranial Nerves: No cranial nerve deficit.   Psychiatric:         Behavior: Behavior normal.         Procedure   Procedures       Assessment/Plan     Problems " Addressed this Visit        Cardiac and Vasculature    Palpitations - Primary    Chest pain       Pulmonary and Pneumonias    Shortness of breath      Diagnoses       Codes Comments    Palpitations    -  Primary ICD-10-CM: R00.2  ICD-9-CM: 785.1     Chest pain, unspecified type     ICD-10-CM: R07.9  ICD-9-CM: 786.50     Shortness of breath     ICD-10-CM: R06.02  ICD-9-CM: 786.05             Recommendation  1.  In regards to palpitations, we have reviewed event monitor with the patient.  Premature beats identified in we discussed options.  I feel we can monitor as she has good LV function with no evidence of ischemia on testing.  Patient is agreeable to plan for now as long as he does not get worse.  Instructions given to call us if that is the case    2.  Patient with mild dyspnea but no progressive shortness of breath.  Her chest pain is atypical and likely related to GERD.  She does not describe any type of ischemic chest discomfort    3.  For now we will continue the same and see her back for follow-up in 6 months to year.  Follow-up with primary as scheduled         Charlottegloria Saleem  reports that she has never smoked. She has never used smokeless tobacco.        Patient's Body mass index is 29.17 kg/m². BMI is above normal parameters. Recommendations include: educational material.       Electronically signed by:

## 2021-03-02 NOTE — PATIENT INSTRUCTIONS
How to Quarantine at Home  Information for Patients and Families    These instructions are for people with confirmed or suspected COVID-19 who do not need to be hospitalized and those with confirmed COVID-19 who were hospitalized and discharged to care for themselves at home.    If you were tested through the Health Department  The Health Department will monitor your wellbeing.  If it is determined that you do not need to be hospitalized and can be isolated at home, you will be monitored by staff from your local or state health department.     If you were tested through a Commercial [facility]  You will need to monitor yourself and report changes in your symptoms to your doctor.  See the section below called Monitor Your Symptoms.    Follow these steps until a healthcare provider or local or state health department says you can return to your normal activities.    Stay home except to get medical care  • Restrict activities outside your home, except for getting medical care.   • Do not go to work, school, or public areas.   • Avoid using public transportation, ride-sharing, or taxis.    Separate yourself from other people and animals in your home  People  As much as possible, you should stay in a specific room and away from other people in your home. Also, you should use a separate bathroom, if available.    Animals  You should restrict contact with pets and other animals while you are sick with COVID-19, just like you would around other people. When possible, have another member of your household care for your animals while you are sick. If you are sick with COVID-19, avoid contact with your pet, including petting, snuggling, being kissed or licked, and sharing food. If you must care for your pet or be around animals while you are sick, wash your hands before and after you interact with pets and wear a facemask. See COVID-19 and Animals for more information.    Call ahead before visiting your doctor  If you have a medical  appointment, call the healthcare provider and tell them that you have or may have COVID-19. This information will help the healthcare provider’s office take steps to keep other people from getting infected or exposed.    Wear a facemask  You should wear a facemask when you are around other people (e.g., sharing a room or vehicle) or pets and before you enter a healthcare provider’s office.     If you are not able to wear a facemask (for example, because it causes trouble breathing), then people who live with you should not stay in the same room with you, or they should wear a facemask if they enter your room.    Cover your coughs and sneezes  • Cover your mouth and nose with a tissue when you cough or sneeze.   • Throw used tissues in a lined trash can.   • Immediately wash your hands with soap and water for at least 20 seconds or, if soap and water are not available, clean your hands with an alcohol-based hand  that contains at least 60% alcohol.    Clean your hands often  • Wash your hands often with soap and water for at least 20 seconds, especially after blowing your nose, coughing, or sneezing; going to the bathroom; and before eating or preparing food.     • If soap and water are not readily available, use an alcohol-based hand  with at least 60% alcohol, covering all surfaces of your hands and rubbing them together until they feel dry.    • Soap and water are the best option if hands are visibly dirty. Avoid touching your eyes, nose, and mouth with unwashed hands.    Avoid sharing personal household items  • You should not share dishes, drinking glasses, cups, eating utensils, towels, or bedding with other people or pets in your home.   • After using these items, they should be washed thoroughly with soap and water.    Clean all “high-touch” surfaces everyday  • High touch surfaces include counters, tabletops, doorknobs, bathroom fixtures, toilets, phones, keyboards, tablets, and bedside  tables.   • Also, clean any surfaces that may have blood, stool, or body fluids on them.   • Use a household cleaning spray or wipe, according to the label instructions. Labels contain instructions for safe and effective use of the cleaning product, including precautions you should take when applying the product, such as wearing gloves and making sure you have good ventilation during use of the product.    Monitor your symptoms  • Seek prompt medical attention if your illness is worsening (e.g., difficulty breathing).   • Before seeking care, call your healthcare provider and tell them that you have, or are being evaluated for, COVID-19.   • Put on a facemask before you enter the facility.     • These steps will help the healthcare provider’s office to keep other people in the office or waiting room from getting infected or exposed.   • Persons who are placed under active monitoring or facilitated self-monitoring should follow instructions provided by their local health department or occupational health professionals, as appropriate.  • If you have a medical emergency and need to call 911, notify the dispatch personnel that you have, or are being evaluated for COVID-19. If possible, put on a facemask before emergency medical services arrive.    Discontinuing home isolation  Patients with confirmed COVID-19 should remain under home isolation precautions until the risk of secondary transmission to others is thought to be low. The decision to discontinue home isolation precautions should be made on a case-by-case basis, in consultation with healthcare providers and state and local health departments.    The below content are for household members, intimate partners, and caregivers of a patient with symptomatic laboratory-confirmed COVID-19 or a patient under investigation:    Household members, intimate partners, and caregivers may have close contact with a person with symptomatic, laboratory-confirmed COVID-19 or a  person under investigation.     Close contacts should monitor their health; they should call their healthcare provider right away if they develop symptoms suggestive of COVID-19 (e.g., fever, cough, shortness of breath)     Close contacts should also follow these recommendations:  • Make sure that you understand and can help the patient follow their healthcare provider’s instructions for medication(s) and care. You should help the patient with basic needs in the home and provide support for getting groceries, prescriptions, and other personal needs.  • Monitor the patient’s symptoms. If the patient is getting sicker, call his or her healthcare provider and tell them that the patient has laboratory-confirmed COVID-19. This will help the healthcare provider’s office take steps to keep other people in the office or waiting room from getting infected. Ask the healthcare provider to call the local or [facility] health department for additional guidance. If the patient has a medical emergency and you need to call 911, notify the dispatch personnel that the patient has, or is being evaluated for COVID-19.  • Household members should stay in another room or be  from the patient as much as possible. Household members should use a separate bedroom and bathroom, if available.  • Prohibit visitors who do not have an essential need to be in the home.  • Household members should care for any pets in the home. Do not handle pets or other animals while sick.  For more information, see COVID-19 and Animals.  • Make sure that shared spaces in the home have good air flow, such as by an air conditioner or an opened window, weather permitting.  • Perform hand hygiene frequently. Wash your hands often with soap and water for at least 20 seconds or use an alcohol-based hand  that contains 60 to 95% alcohol, covering all surfaces of your hands and rubbing them together until they feel dry. Soap and water should be used  preferentially if hands are visibly dirty.  • Avoid touching your eyes, nose, and mouth with unwashed hands.  • The patient should wear a facemask when you are around other people. If the patient is not able to wear a facemask (for example, because it causes trouble breathing), you, as the caregiver, should wear a mask when you are in the same room as the patient.  • Wear a disposable facemask and gloves when you touch or have contact with the patient’s blood, stool, or body fluids, such as saliva, sputum, nasal mucus, vomit, or urine.   o Throw out disposable facemasks and gloves after using them. Do not reuse.  o When removing personal protective equipment, first remove and dispose of gloves. Then, immediately clean your hands with soap and water or alcohol-based hand . Next, remove and dispose of facemask, and immediately clean your hands again with soap and water or alcohol-based hand .  • Avoid sharing household items with the patient. You should not share dishes, drinking glasses, cups, eating utensils, towels, bedding, or other items. After the patient uses these items, you should wash them thoroughly (see below “Wash laundry thoroughly”).  • Clean all “high-touch” surfaces, such as counters, tabletops, doorknobs, bathroom fixtures, toilets, phones, keyboards, tablets, and bedside tables, every day. Also, clean any surfaces that may have blood, stool, or body fluids on them.   o Use a household cleaning spray or wipe, according to the label instructions. Labels contain instructions for safe and effective use of the cleaning product including precautions you should take when applying the product, such as wearing gloves and making sure you have good ventilation during use of the product.  • Wash laundry thoroughly.   o Immediately remove and wash clothes or bedding that have blood, stool, or body fluids on them.  o Wear disposable gloves while handling soiled items and keep soiled items away  from your body. Clean your hands (with soap and water or an alcohol-based hand ) immediately after removing your gloves.  o Read and follow directions on labels of laundry or clothing items and detergent. In general, using a normal laundry detergent according to washing machine instructions and dry thoroughly using the warmest temperatures recommended on the clothing label.  • Place all used disposable gloves, facemasks, and other contaminated items in a lined container before disposing of them with other household waste. Clean your hands (with soap and water or an alcohol-based hand ) immediately after handling these items. Soap and water should be used preferentially if hands are visibly dirty.  • Discuss any additional questions with your state or local health department or healthcare provider.    Adapted from information provided by the Centers for Disease Control and Prevention.  For more information, visit https://www.cdc.gov/coronavirus/2019-ncov/hcp/guidance-prevent-spread.htmlHeart-Healthy Eating Plan  Heart-healthy meal planning includes:  · Eating less unhealthy fats.  · Eating more healthy fats.  · Making other changes in your diet.  Talk with your doctor or a diet specialist (dietitian) to create an eating plan that is right for you.  What is my plan?  Your doctor may recommend an eating plan that includes:  · Total fat: ______% or less of total calories a day.  · Saturated fat: ______% or less of total calories a day.  · Cholesterol: less than _________mg a day.  What are tips for following this plan?  Cooking  Avoid frying your food. Try to bake, boil, grill, or broil it instead. You can also reduce fat by:  · Removing the skin from poultry.  · Removing all visible fats from meats.  · Steaming vegetables in water or broth.  Meal planning    · At meals, divide your plate into four equal parts:  ? Fill one-half of your plate with vegetables and green salads.  ? Fill one-fourth of your  plate with whole grains.  ? Fill one-fourth of your plate with lean protein foods.  · Eat 4-5 servings of vegetables per day. A serving of vegetables is:  ? 1 cup of raw or cooked vegetables.  ? 2 cups of raw leafy greens.  · Eat 4-5 servings of fruit per day. A serving of fruit is:  ? 1 medium whole fruit.  ? ¼ cup of dried fruit.  ? ½ cup of fresh, frozen, or canned fruit.  ? ½ cup of 100% fruit juice.  · Eat more foods that have soluble fiber. These are apples, broccoli, carrots, beans, peas, and barley. Try to get 20-30 g of fiber per day.  · Eat 4-5 servings of nuts, legumes, and seeds per week:  ? 1 serving of dried beans or legumes equals ½ cup after being cooked.  ? 1 serving of nuts is ¼ cup.  ? 1 serving of seeds equals 1 tablespoon.  General information  · Eat more home-cooked food. Eat less restaurant, buffet, and fast food.  · Limit or avoid alcohol.  · Limit foods that are high in starch and sugar.  · Avoid fried foods.  · Lose weight if you are overweight.  · Keep track of how much salt (sodium) you eat. This is important if you have high blood pressure. Ask your doctor to tell you more about this.  · Try to add vegetarian meals each week.  Fats  · Choose healthy fats. These include olive oil and canola oil, flaxseeds, walnuts, almonds, and seeds.  · Eat more omega-3 fats. These include salmon, mackerel, sardines, tuna, flaxseed oil, and ground flaxseeds. Try to eat fish at least 2 times each week.  · Check food labels. Avoid foods with trans fats or high amounts of saturated fat.  · Limit saturated fats.  ? These are often found in animal products, such as meats, butter, and cream.  ? These are also found in plant foods, such as palm oil, palm kernel oil, and coconut oil.  · Avoid foods with partially hydrogenated oils in them. These have trans fats. Examples are stick margarine, some tub margarines, cookies, crackers, and other baked goods.  What foods can I eat?  Fruits  All fresh, canned (in  natural juice), or frozen fruits.  Vegetables  Fresh or frozen vegetables (raw, steamed, roasted, or grilled). Green salads.  Grains  Most grains. Choose whole wheat and whole grains most of the time. Rice and pasta, including brown rice and pastas made with whole wheat.  Meats and other proteins  Lean, well-trimmed beef, veal, pork, and lamb. Chicken and turkey without skin. All fish and shellfish. Wild duck, rabbit, pheasant, and venison. Egg whites or low-cholesterol egg substitutes. Dried beans, peas, lentils, and tofu. Seeds and most nuts.  Dairy  Low-fat or nonfat cheeses, including ricotta and mozzarella. Skim or 1% milk that is liquid, powdered, or evaporated. Buttermilk that is made with low-fat milk. Nonfat or low-fat yogurt.  Fats and oils  Non-hydrogenated (trans-free) margarines. Vegetable oils, including soybean, sesame, sunflower, olive, peanut, safflower, corn, canola, and cottonseed. Salad dressings or mayonnaise made with a vegetable oil.  Beverages  Mineral water. Coffee and tea. Diet carbonated beverages.  Sweets and desserts  Sherbet, gelatin, and fruit ice. Small amounts of dark chocolate.  Limit all sweets and desserts.  Seasonings and condiments  All seasonings and condiments.  The items listed above may not be a complete list of foods and drinks you can eat. Contact a dietitian for more options.  What foods should I avoid?  Fruits  Canned fruit in heavy syrup. Fruit in cream or butter sauce. Fried fruit. Limit coconut.  Vegetables  Vegetables cooked in cheese, cream, or butter sauce. Fried vegetables.  Grains  Breads that are made with saturated or trans fats, oils, or whole milk. Croissants. Sweet rolls. Donuts. High-fat crackers, such as cheese crackers.  Meats and other proteins  Fatty meats, such as hot dogs, ribs, sausage, mcmahon, rib-eye roast or steak. High-fat deli meats, such as salami and bologna. Caviar. Domestic duck and goose. Organ meats, such as liver.  Dairy  Cream, sour  cream, cream cheese, and creamed cottage cheese. Whole-milk cheeses. Whole or 2% milk that is liquid, evaporated, or condensed. Whole buttermilk. Cream sauce or high-fat cheese sauce. Yogurt that is made from whole milk.  Fats and oils  Meat fat, or shortening. Cocoa butter, hydrogenated oils, palm oil, coconut oil, palm kernel oil. Solid fats and shortenings, including mcmahon fat, salt pork, lard, and butter. Nondairy cream substitutes. Salad dressings with cheese or sour cream.  Beverages  Regular sodas and juice drinks with added sugar.  Sweets and desserts  Frosting. Pudding. Cookies. Cakes. Pies. Milk chocolate or white chocolate. Buttered syrups. Full-fat ice cream or ice cream drinks.  The items listed above may not be a complete list of foods and drinks to avoid. Contact a dietitian for more information.  Summary  · Heart-healthy meal planning includes eating less unhealthy fats, eating more healthy fats, and making other changes in your diet.  · Eat a balanced diet. This includes fruits and vegetables, low-fat or nonfat dairy, lean protein, nuts and legumes, whole grains, and heart-healthy oils and fats.  This information is not intended to replace advice given to you by your health care provider. Make sure you discuss any questions you have with your health care provider.  Document Revised: 02/21/2019 Document Reviewed: 01/25/2019  [employer] Patient Education © 2020 [employer]

## 2021-08-17 DIAGNOSIS — Z86.16 HISTORY OF COVID-19: Primary | ICD-10-CM

## 2021-08-17 DIAGNOSIS — R06.02 SHORTNESS OF BREATH: ICD-10-CM

## 2021-08-17 DIAGNOSIS — R00.2 PALPITATIONS: ICD-10-CM

## 2021-09-23 ENCOUNTER — APPOINTMENT (OUTPATIENT)
Dept: CARDIOLOGY | Facility: HOSPITAL | Age: 46
End: 2021-09-23

## 2021-10-15 ENCOUNTER — APPOINTMENT (OUTPATIENT)
Dept: WOMENS IMAGING | Facility: HOSPITAL | Age: 46
End: 2021-10-15

## 2021-10-15 PROCEDURE — 77067 SCR MAMMO BI INCL CAD: CPT | Performed by: RADIOLOGY

## 2021-10-15 PROCEDURE — 77063 BREAST TOMOSYNTHESIS BI: CPT | Performed by: RADIOLOGY

## 2022-03-02 ENCOUNTER — OFFICE VISIT (OUTPATIENT)
Dept: CARDIOLOGY | Facility: CLINIC | Age: 47
End: 2022-03-02

## 2022-03-02 VITALS
SYSTOLIC BLOOD PRESSURE: 122 MMHG | OXYGEN SATURATION: 100 % | WEIGHT: 179 LBS | HEART RATE: 84 BPM | HEIGHT: 64 IN | BODY MASS INDEX: 30.56 KG/M2 | DIASTOLIC BLOOD PRESSURE: 83 MMHG

## 2022-03-02 DIAGNOSIS — R00.2 PALPITATIONS: ICD-10-CM

## 2022-03-02 DIAGNOSIS — R07.9 CHEST PAIN, UNSPECIFIED TYPE: Primary | ICD-10-CM

## 2022-03-02 DIAGNOSIS — I10 ESSENTIAL HYPERTENSION: ICD-10-CM

## 2022-03-02 PROCEDURE — 99213 OFFICE O/P EST LOW 20 MIN: CPT | Performed by: PHYSICIAN ASSISTANT

## 2022-03-02 PROCEDURE — 93000 ELECTROCARDIOGRAM COMPLETE: CPT | Performed by: PHYSICIAN ASSISTANT

## 2022-03-02 RX ORDER — OMEPRAZOLE 40 MG/1
40 CAPSULE, DELAYED RELEASE ORAL DAILY
COMMUNITY

## 2022-03-02 NOTE — PROGRESS NOTES
Problem list     Subjective   Eva Saleem is a 47 y.o. female     Chief Complaint   Patient presents with   • Dizziness   • Palpitations   problem list  1.  History of chest pain  1.1 regular treadmill stress test 2020 - for any electrocardiographic evidence of ischemia  2.  Preserved systolic function  3.  Palpitations  3.1 event monitor 2020 with PACs and PVCs but no other arrhythmias identified  4.  GERD    HPI    Patient is a 47-year-old female that is following up for yearly evaluation    She has done remarkably well since her last visit.  She occasionally might feel some chest tightness but it is mild.  She does not describe any progressive pain.  Her chest tightness happens on occasion.    She does not describe any dyspnea.  No complaints of PND or orthopnea.    She occasionally may sense palpitations but they are manageable.  She does not describe any associated dizziness, presyncope or syncope    She describes having issues with vertigo in the past.  Otherwise she is doing well      Current Outpatient Medications on File Prior to Visit   Medication Sig Dispense Refill   • acetaminophen (TYLENOL) 500 MG tablet Take 500 mg by mouth Every 6 (Six) Hours As Needed for Mild Pain .     • hydroCHLOROthiazide (HYDRODIURIL) 25 MG tablet Take 25 mg by mouth Every Night.     • ibuprofen (ADVIL,MOTRIN) 600 MG tablet Take 600 mg by mouth Every 6 (Six) Hours As Needed for Mild Pain .     • Loratadine-Pseudoephedrine (CLARITIN-D 24 HOUR PO) Take  by mouth As Needed.     • nitroglycerin (NITROSTAT) 0.4 MG SL tablet 1 under the tongue as needed for angina, may repeat q5mins for up three doses 25 tablet 11   • omeprazole (priLOSEC) 40 MG capsule Take 40 mg by mouth Daily.     • Rhubarb (ESTROVEN MENOPAUSE RELIEF PO) Take  by mouth Every Night.     • [DISCONTINUED] aspirin 81 MG chewable tablet Chew 81 mg Every Night.     • [DISCONTINUED] famotidine (PEPCID) 20 MG tablet Take 20 mg by mouth 2 (Two) Times a Day.     •  [DISCONTINUED] lisinopril (PRINIVIL,ZESTRIL) 10 MG tablet Take  by mouth Daily.     • [DISCONTINUED] NON FORMULARY Female tonic- 3 tsp BID        No current facility-administered medications on file prior to visit.       Patient has no known allergies.    Past Medical History:   Diagnosis Date   • GERD (gastroesophageal reflux disease)    • GERD (gastroesophageal reflux disease)    • HTN (hypertension)    • Seasonal allergies        Social History     Socioeconomic History   • Marital status:    Tobacco Use   • Smoking status: Never Smoker   • Smokeless tobacco: Never Used   Substance and Sexual Activity   • Alcohol use: Not Currently   • Drug use: Never   • Sexual activity: Defer       Family History   Problem Relation Age of Onset   • Lymphoma Mother    • Hypertension Father    • Diabetes Father    • Gout Sister    • Other Sister         gout   • Diabetes Brother    • No Known Problems Maternal Grandmother    • No Known Problems Maternal Grandfather    • No Known Problems Paternal Grandmother    • No Known Problems Paternal Grandfather        Review of Systems   Constitutional: Negative.  Negative for chills, fatigue and fever.   HENT: Negative for congestion and sinus pressure.    Respiratory: Positive for chest tightness. Negative for shortness of breath.    Cardiovascular: Positive for palpitations (skips at times). Negative for chest pain and leg swelling.   Gastrointestinal: Negative.  Negative for abdominal pain, blood in stool, constipation, diarrhea, nausea and vomiting.   Genitourinary: Negative.  Negative for dysuria, frequency, hematuria and urgency.   Neurological: Positive for dizziness (when sitting sitting or standing). Negative for syncope and light-headedness.   Psychiatric/Behavioral: Negative.  Negative for sleep disturbance (denies waking with soa or cp).       Objective   Vitals:    03/02/22 0935   BP: 122/83   BP Location: Left arm   Patient Position: Sitting   Pulse: 84   SpO2: 100%  "  Weight: 81.2 kg (179 lb)   Height: 162.6 cm (64\")      /83 (BP Location: Left arm, Patient Position: Sitting)   Pulse 84   Ht 162.6 cm (64\")   Wt 81.2 kg (179 lb)   SpO2 100%   BMI 30.73 kg/m²     Lab Results (most recent)     None          Physical Exam  Vitals and nursing note reviewed.   Constitutional:       General: She is not in acute distress.     Appearance: Normal appearance. She is well-developed.   HENT:      Head: Normocephalic and atraumatic.   Eyes:      General: No scleral icterus.        Right eye: No discharge.         Left eye: No discharge.      Conjunctiva/sclera: Conjunctivae normal.   Neck:      Vascular: No carotid bruit.   Cardiovascular:      Rate and Rhythm: Normal rate and regular rhythm.      Heart sounds: Normal heart sounds. No murmur heard.  No friction rub. No gallop.    Pulmonary:      Effort: Pulmonary effort is normal. No respiratory distress.      Breath sounds: Normal breath sounds. No wheezing or rales.   Chest:      Chest wall: No tenderness.   Musculoskeletal:      Right lower leg: No edema.      Left lower leg: No edema.   Skin:     General: Skin is warm and dry.      Coloration: Skin is not pale.      Findings: No erythema or rash.   Neurological:      Mental Status: She is alert and oriented to person, place, and time.      Cranial Nerves: No cranial nerve deficit.   Psychiatric:         Behavior: Behavior normal.         Procedure     ECG 12 Lead    Date/Time: 3/2/2022 9:40 AM  Performed by: Eric Pham PA  Authorized by: Eric Pham PA   Comparison: compared with previous ECG from 10/23/2020  Comments: EKG demonstrates sinus rhythm at 67 bpm with no acute ST changes               Assessment/Plan     Problems Addressed this Visit        Cardiac and Vasculature    Palpitations    Chest pain - Primary      Other Visit Diagnoses     Essential hypertension          Diagnoses       Codes Comments    Chest pain, unspecified type    -  Primary " ICD-10-CM: R07.9  ICD-9-CM: 786.50     Palpitations     ICD-10-CM: R00.2  ICD-9-CM: 785.1     Essential hypertension     ICD-10-CM: I10  ICD-9-CM: 401.9           Recommendation  1.  Patient is a 47-year-old female with history of chest pain and complains of intermittent tightness but feels she is doing well.  She does not want further evaluation as it is very mild.  For now she will monitor and call us if symptoms worsen    2.  She has palpitations but they are manageable.  She has history of PVCs and PACs but no malignant arrhythmia identified on event monitoring.  For now, she wants no medical therapy and would rather monitor symptoms    3.  Patient with history of hypertension but doing well at this point.  Lisinopril apparently was stopped.  For now nothing further we can monitor.  We will see her back for follow-up in a year or sooner as symptoms discussed.  Follow-up with primary as scheduled           Patient did not bring med list or medicine bottles to appointment, med list has been reviewed and updated based on patient's knowledge of their meds.          Electronically signed by:  Eric Pham

## 2022-03-02 NOTE — PATIENT INSTRUCTIONS

## 2022-10-03 ENCOUNTER — TELEPHONE (OUTPATIENT)
Dept: CARDIOLOGY | Facility: CLINIC | Age: 47
End: 2022-10-03

## 2022-10-03 NOTE — TELEPHONE ENCOUNTER
Scheduled pt for 10/11/22 at 10am.       I called PCP's office back and LVM w/ updated appt info.   Also called pt w/ appt info, she verba;baileyed understanding and is aware that there may be a little bit of a wait.

## 2022-10-03 NOTE — TELEPHONE ENCOUNTER
----- Message from VENITA Mata sent at 10/3/2022  3:18 PM EDT -----  Regarding: RE: FYI- records incoming. PCP wants pt in sooner  I can add her on at a 10 am opening or 2 pm. Try to choose a day that looks more feasible    ----- Message -----  From: Barrett Bharti  Sent: 10/3/2022   2:16 PM EDT  To: VENITA Mata, Chelle Marino LPN  Subject: FYI- records incoming. PCP wants pt in sooner    Pt's PCP called FO line stating pt needs a sooner appt due to tachy, SoB. EKG was normal, but PCP wants her seen prior to 3/2022 appointment.   I asked that she send us records for review, as he currently has no openings and will need to review the notes. She stated she is sending records now.

## 2022-10-11 ENCOUNTER — OFFICE VISIT (OUTPATIENT)
Dept: CARDIOLOGY | Facility: CLINIC | Age: 47
End: 2022-10-11

## 2022-10-11 VITALS
BODY MASS INDEX: 30.39 KG/M2 | DIASTOLIC BLOOD PRESSURE: 71 MMHG | SYSTOLIC BLOOD PRESSURE: 119 MMHG | OXYGEN SATURATION: 99 % | HEIGHT: 64 IN | HEART RATE: 82 BPM | WEIGHT: 178 LBS

## 2022-10-11 DIAGNOSIS — I10 ESSENTIAL HYPERTENSION: ICD-10-CM

## 2022-10-11 DIAGNOSIS — R42 DIZZINESS: Primary | ICD-10-CM

## 2022-10-11 DIAGNOSIS — R07.9 CHEST PAIN, UNSPECIFIED TYPE: ICD-10-CM

## 2022-10-11 DIAGNOSIS — R00.2 PALPITATIONS: ICD-10-CM

## 2022-10-11 PROCEDURE — 99214 OFFICE O/P EST MOD 30 MIN: CPT | Performed by: PHYSICIAN ASSISTANT

## 2022-10-11 RX ORDER — MULTIVIT-MIN/IRON/FOLIC ACID/K 18-600-40
1 CAPSULE ORAL
COMMUNITY

## 2022-10-11 RX ORDER — CHOLECALCIFEROL (VITAMIN D3) 25 MCG
CAPSULE ORAL DAILY
COMMUNITY

## 2022-10-11 RX ORDER — LANOLIN ALCOHOL/MO/W.PET/CERES
2000 CREAM (GRAM) TOPICAL DAILY
COMMUNITY

## 2022-10-11 RX ORDER — METOPROLOL SUCCINATE 25 MG/1
25 TABLET, EXTENDED RELEASE ORAL DAILY
Qty: 30 TABLET | Refills: 11 | Status: SHIPPED | OUTPATIENT
Start: 2022-10-11

## 2022-10-11 NOTE — PROGRESS NOTES
Problem list     Subjective   Eva Saleem is a 47 y.o. female     Chief Complaint   Patient presents with   • Shortness of Breath     Here for eval.    • Palpitations   problem list  1.  History of chest pain  1.1 regular treadmill stress test 2020 - for any electrocardiographic evidence of ischemia  1.2 cardiac CTA 2021 with no obstructive coronary disease identified  2.  Preserved systolic function  3.  Palpitations  3.1 event monitor 2020 with PACs and PVCs but no other arrhythmias identified  4.  GERD    HPI    Patient is a 47-year-old female who presents to the office for evaluation.  She was seen in the past and had work-up as above.  Recently, she saw her primary and she describes there being some concern about the possibility of POTS syndrome.  She has episodic dizziness and lightheadedness although she has never had a syncopal episode    She feels her heart race at times.  This seems to occur at random as well.    She does feel occasional discomfort in her chest.  She will feel a pressure type sensation.  This is something that she has experienced in the past.  She does not describe severe symptoms.  She had cardiac work-up to include coronary CTA which showed no obstructive disease    She has mild dyspnea but no progressive shortness of breath.  No complaints of PND orthopnea.    Otherwise patient is stable                Current Outpatient Medications on File Prior to Visit   Medication Sig Dispense Refill   • acetaminophen (TYLENOL) 500 MG tablet Take 500 mg by mouth Every 6 (Six) Hours As Needed for Mild Pain .     • Ascorbic Acid (Vitamin C) 500 MG capsule Take 1 tablet by mouth. 3 x weekly     • Cholecalciferol (Vitamin D-3) 25 MCG (1000 UT) capsule Take  by mouth Daily.     • Ferrous Sulfate (Iron) 142 (45 Fe) MG tablet controlled-release 1 tablet. 3 x week     • ibuprofen (ADVIL,MOTRIN) 600 MG tablet Take 600 mg by mouth Every 6 (Six) Hours As Needed for Mild Pain .     • Multiple  Vitamins-Calcium (ONE-A-DAY WOMENS FORMULA PO) Daily.     • omeprazole (priLOSEC) 40 MG capsule Take 40 mg by mouth Daily.     • Rhubarb (ESTROVEN MENOPAUSE RELIEF PO) Take  by mouth Every Night.     • vitamin B-12 (CYANOCOBALAMIN) 1000 MCG tablet Take 2 tablets by mouth Daily.     • [DISCONTINUED] hydroCHLOROthiazide (HYDRODIURIL) 25 MG tablet Take 25 mg by mouth Every Night.     • nitroglycerin (NITROSTAT) 0.4 MG SL tablet 1 under the tongue as needed for angina, may repeat q5mins for up three doses 25 tablet 11   • [DISCONTINUED] lisinopril (PRINIVIL,ZESTRIL) 10 MG tablet Take  by mouth Daily.     • [DISCONTINUED] Loratadine-Pseudoephedrine (CLARITIN-D 24 HOUR PO) Take  by mouth As Needed.       No current facility-administered medications on file prior to visit.       Patient has no known allergies.    Past Medical History:   Diagnosis Date   • Anemia    • Essential hypertension 10/11/2022   • GERD (gastroesophageal reflux disease)    • GERD (gastroesophageal reflux disease)    • HTN (hypertension)    • Seasonal allergies        Social History     Socioeconomic History   • Marital status:    Tobacco Use   • Smoking status: Never   • Smokeless tobacco: Never   Substance and Sexual Activity   • Alcohol use: Not Currently   • Drug use: Never   • Sexual activity: Defer       Family History   Problem Relation Age of Onset   • Lymphoma Mother    • Hypertension Father    • Diabetes Father    • Gout Sister    • Other Sister         gout   • Diabetes Brother    • No Known Problems Maternal Grandmother    • No Known Problems Maternal Grandfather    • No Known Problems Paternal Grandmother    • No Known Problems Paternal Grandfather        Review of Systems   Constitutional: Positive for fatigue.   HENT: Negative.    Eyes: Positive for visual disturbance (glasses prn).   Respiratory: Positive for shortness of breath (occas. ).    Cardiovascular: Positive for chest pain (tightness occas.) and palpitations. Negative  "for leg swelling.   Gastrointestinal:        Abd. bloating   Endocrine: Negative.    Genitourinary: Negative.    Musculoskeletal: Negative.    Skin: Negative.    Allergic/Immunologic: Negative.    Neurological: Positive for dizziness and light-headedness. Negative for syncope.   Hematological: Negative.    Psychiatric/Behavioral: Negative.        Objective   Vitals:    10/11/22 0954   BP: 119/71   BP Location: Left arm   Patient Position: Sitting   Pulse: 82   SpO2: 99%   Weight: 80.7 kg (178 lb)   Height: 162.6 cm (64.02\")      /71 (BP Location: Left arm, Patient Position: Sitting)   Pulse 82   Ht 162.6 cm (64.02\")   Wt 80.7 kg (178 lb)   SpO2 99%   BMI 30.54 kg/m²     Lab Results (most recent)     None          Physical Exam  Vitals and nursing note reviewed.   Constitutional:       General: She is not in acute distress.     Appearance: Normal appearance. She is well-developed.   HENT:      Head: Normocephalic and atraumatic.   Eyes:      General: No scleral icterus.        Right eye: No discharge.         Left eye: No discharge.      Conjunctiva/sclera: Conjunctivae normal.   Neck:      Vascular: No carotid bruit.   Cardiovascular:      Rate and Rhythm: Normal rate and regular rhythm.      Heart sounds: Normal heart sounds. No murmur heard.    No friction rub. No gallop.   Pulmonary:      Effort: Pulmonary effort is normal. No respiratory distress.      Breath sounds: Normal breath sounds. No wheezing or rales.   Chest:      Chest wall: No tenderness.   Musculoskeletal:      Right lower leg: No edema.      Left lower leg: No edema.   Skin:     General: Skin is warm and dry.      Coloration: Skin is not pale.      Findings: No erythema or rash.   Neurological:      Mental Status: She is alert and oriented to person, place, and time.      Cranial Nerves: No cranial nerve deficit.   Psychiatric:         Behavior: Behavior normal.         Procedure   Procedures       Assessment & Plan     Problems " Addressed this Visit        Cardiac and Vasculature    Palpitations    Relevant Orders    Tilt Table    Chest pain    Relevant Orders    Tilt Table    Essential hypertension    Relevant Medications    metoprolol succinate XL (TOPROL-XL) 25 MG 24 hr tablet    Other Relevant Orders    Tilt Table       Symptoms and Signs    Dizziness - Primary    Relevant Orders    Tilt Table   Diagnoses       Codes Comments    Dizziness    -  Primary ICD-10-CM: R42  ICD-9-CM: 780.4     Palpitations     ICD-10-CM: R00.2  ICD-9-CM: 785.1     Essential hypertension     ICD-10-CM: I10  ICD-9-CM: 401.9     Chest pain, unspecified type     ICD-10-CM: R07.9  ICD-9-CM: 786.50       Recommendation  1.  Patient is a 47-year-old female that presents to the office for evaluation and speaks about getting a tilt table test performed which we will order.    2.  I would like to stop her hydrochlorothiazide as this could exacerbate if she did have pots syndrome.  I would like to transition her to a low-dose beta-blocker which can help with blood pressure control but also her palpitations that she experiences.  She wore event monitor in the past showing premature beats.  Hopefully this medication will treat she will have some improvement    3.  Otherwise, we will start with above and see her back for follow-up.  If symptoms were to worsen, or to call the office.  Instructed her to call back in 1 to 2 weeks for blood pressure reading and symptom check.  She is to follow with primary as scheduled               Charlottegloria Saleem  reports that she has never smoked. She has never used smokeless tobacco.. I have educated her on the risk of diseases from using tobacco products such as cancer, COPD and heart disease.               BMI is >= 30 and <35. (Class 1 Obesity). The following options were offered after discussion;: pcp addressing       Electronically signed by:

## 2022-11-03 DIAGNOSIS — I10 ESSENTIAL HYPERTENSION: ICD-10-CM

## 2022-11-03 DIAGNOSIS — R07.9 CHEST PAIN, UNSPECIFIED TYPE: ICD-10-CM

## 2022-11-03 DIAGNOSIS — R00.2 PALPITATIONS: ICD-10-CM

## 2022-11-03 DIAGNOSIS — R42 DIZZINESS: ICD-10-CM

## 2022-12-16 ENCOUNTER — APPOINTMENT (OUTPATIENT)
Dept: WOMENS IMAGING | Facility: HOSPITAL | Age: 47
End: 2022-12-16

## 2022-12-16 PROCEDURE — 77063 BREAST TOMOSYNTHESIS BI: CPT | Performed by: RADIOLOGY

## 2022-12-16 PROCEDURE — 77067 SCR MAMMO BI INCL CAD: CPT | Performed by: RADIOLOGY

## 2023-03-02 ENCOUNTER — OFFICE VISIT (OUTPATIENT)
Dept: CARDIOLOGY | Facility: CLINIC | Age: 48
End: 2023-03-02
Payer: COMMERCIAL

## 2023-03-02 VITALS
SYSTOLIC BLOOD PRESSURE: 122 MMHG | OXYGEN SATURATION: 99 % | BODY MASS INDEX: 32.64 KG/M2 | WEIGHT: 191.2 LBS | DIASTOLIC BLOOD PRESSURE: 76 MMHG | HEIGHT: 64 IN | HEART RATE: 69 BPM

## 2023-03-02 DIAGNOSIS — R00.2 PALPITATIONS: Primary | ICD-10-CM

## 2023-03-02 DIAGNOSIS — I10 ESSENTIAL HYPERTENSION: ICD-10-CM

## 2023-03-02 DIAGNOSIS — R06.02 SHORTNESS OF BREATH: ICD-10-CM

## 2023-03-02 PROCEDURE — 93000 ELECTROCARDIOGRAM COMPLETE: CPT | Performed by: PHYSICIAN ASSISTANT

## 2023-03-02 PROCEDURE — 99213 OFFICE O/P EST LOW 20 MIN: CPT | Performed by: PHYSICIAN ASSISTANT

## 2023-03-02 NOTE — PROGRESS NOTES
Problem list     Subjective   Eva Saleem is a 48 y.o. female     Chief Complaint   Patient presents with   • Follow-up     1 YEAR F/U    problem list  1.  History of chest pain  1.1 regular treadmill stress test 2020 - for any electrocardiographic evidence of ischemia  1.2 cardiac CTA 2021 with no obstructive coronary disease identified  2.  Preserved systolic function  3.  Palpitations  3.1 event monitor 2020 with PACs and PVCs but no other arrhythmias identified  4.  GERD    HPI    Patient is a 48-year-old female who presents to the office for evaluation.  Patient has history of nonobstructive coronary disease.  Patient also has history of PVCs and PACs.  She has been on metoprolol for therapy.    She has done well.  She does not describe any pain or pressure.  She can be short of breath at times but it is very mild.  No PND orthopnea.    She has done well on medical therapy in regards to palpitations.  She does not describe any dizziness, presyncope or syncope.  She is stable otherwise.  Current Outpatient Medications on File Prior to Visit   Medication Sig Dispense Refill   • acetaminophen (TYLENOL) 500 MG tablet Take 1 tablet by mouth Every 6 (Six) Hours As Needed for Mild Pain.     • Ascorbic Acid (Vitamin C) 500 MG capsule Take 1 tablet by mouth. 3 x weekly     • Cholecalciferol (Vitamin D-3) 25 MCG (1000 UT) capsule Take  by mouth Daily.     • Ferrous Sulfate (Iron) 142 (45 Fe) MG tablet controlled-release 1 tablet. 3 x week     • ibuprofen (ADVIL,MOTRIN) 600 MG tablet Take 1 tablet by mouth Every 6 (Six) Hours As Needed for Mild Pain.     • metoprolol succinate XL (TOPROL-XL) 25 MG 24 hr tablet Take 1 tablet by mouth Daily. 30 tablet 11   • Multiple Vitamins-Calcium (ONE-A-DAY WOMENS FORMULA PO) Daily.     • nitroglycerin (NITROSTAT) 0.4 MG SL tablet 1 under the tongue as needed for angina, may repeat q5mins for up three doses 25 tablet 11   • omeprazole (priLOSEC) 40 MG capsule Take 1 capsule by mouth  Daily.     • Rhubarb (ESTROVEN MENOPAUSE RELIEF PO) Take  by mouth Every Night.     • vitamin B-12 (CYANOCOBALAMIN) 1000 MCG tablet Take 2 tablets by mouth Daily.     • [DISCONTINUED] lisinopril (PRINIVIL,ZESTRIL) 10 MG tablet Take  by mouth Daily.       No current facility-administered medications on file prior to visit.       Patient has no known allergies.    Past Medical History:   Diagnosis Date   • Anemia    • Essential hypertension 10/11/2022   • GERD (gastroesophageal reflux disease)    • GERD (gastroesophageal reflux disease)    • HTN (hypertension)    • Seasonal allergies        Social History     Socioeconomic History   • Marital status:    Tobacco Use   • Smoking status: Never   • Smokeless tobacco: Never   Substance and Sexual Activity   • Alcohol use: Not Currently   • Drug use: Never   • Sexual activity: Defer       Family History   Problem Relation Age of Onset   • Lymphoma Mother    • Hypertension Father    • Diabetes Father    • Gout Sister    • Other Sister         gout   • Diabetes Brother    • No Known Problems Maternal Grandmother    • No Known Problems Maternal Grandfather    • No Known Problems Paternal Grandmother    • No Known Problems Paternal Grandfather        Review of Systems   Constitutional: Negative for appetite change, chills and fever.   HENT: Negative.  Negative for dental problem, drooling, ear discharge, ear pain, facial swelling, rhinorrhea, sinus pressure, sinus pain, sneezing, sore throat and tinnitus.    Eyes: Negative for pain, redness, itching and visual disturbance.   Respiratory: Positive for shortness of breath. Negative for cough, choking and wheezing.    Cardiovascular: Negative for chest pain, palpitations and leg swelling.   Gastrointestinal: Negative for blood in stool, constipation, diarrhea and nausea.   Genitourinary: Negative.  Negative for difficulty urinating.   Skin: Negative for rash and wound.   Neurological: Negative for dizziness, weakness and  "numbness.   Psychiatric/Behavioral: Positive for sleep disturbance.       Objective   Vitals:    03/02/23 0851   BP: 122/76   Pulse: 69   SpO2: 99%   Weight: 86.7 kg (191 lb 3.2 oz)   Height: 162.6 cm (64.02\")      /76   Pulse 69   Ht 162.6 cm (64.02\")   Wt 86.7 kg (191 lb 3.2 oz)   SpO2 99%   BMI 32.80 kg/m²     Lab Results (most recent)     None          Physical Exam  Vitals and nursing note reviewed.   Constitutional:       General: She is not in acute distress.     Appearance: Normal appearance. She is well-developed.   HENT:      Head: Normocephalic and atraumatic.   Eyes:      General: No scleral icterus.        Right eye: No discharge.         Left eye: No discharge.      Conjunctiva/sclera: Conjunctivae normal.   Neck:      Vascular: No carotid bruit.   Cardiovascular:      Rate and Rhythm: Normal rate and regular rhythm.      Heart sounds: Normal heart sounds. No murmur heard.    No friction rub. No gallop.   Pulmonary:      Effort: Pulmonary effort is normal. No respiratory distress.      Breath sounds: Normal breath sounds. No wheezing or rales.   Chest:      Chest wall: No tenderness.   Musculoskeletal:      Right lower leg: No edema.      Left lower leg: No edema.   Skin:     General: Skin is warm and dry.      Coloration: Skin is not pale.      Findings: No erythema or rash.   Neurological:      Mental Status: She is alert and oriented to person, place, and time.      Cranial Nerves: No cranial nerve deficit.   Psychiatric:         Behavior: Behavior normal.         Procedure     ECG 12 Lead    Date/Time: 3/2/2023 8:53 AM  Performed by: Eric Pham PA  Authorized by: Eric Pham PA   Comparison: compared with previous ECG from 3/2/2022  Comments: EKG demonstrates sinus rhythm at 64 bpm with no acute ST changes               Assessment & Plan     Problems Addressed this Visit        Cardiac and Vasculature    Palpitations - Primary    Essential hypertension       Pulmonary and " Pneumonias    Shortness of breath   Diagnoses       Codes Comments    Palpitations    -  Primary ICD-10-CM: R00.2  ICD-9-CM: 785.1     Shortness of breath     ICD-10-CM: R06.02  ICD-9-CM: 786.05     Essential hypertension     ICD-10-CM: I10  ICD-9-CM: 401.9         Recommendation  1.  Patient is a 48-year-old female who presents back for follow-up remarkably well without chest pain.  She occasionally can experience some mild dyspnea but overall has felt remarkable.  For now, with history of nonobstructive coronary disease by CTA and preserved systolic function, nothing further.    2.  Palpitations currently are being suppressed for the most part with medicines.  We will continue to monitor.    3.  Patient's blood pressures are currently doing well medical therapy.  We will continue to monitor    4.  We will see her back for follow-up in a year or sooner if needed.  Follow-up with primary as scheduled.             Eva Saleem  reports that she has never smoked. She has never used smokeless tobacco..                Electronically signed by:

## 2023-09-25 RX ORDER — METOPROLOL SUCCINATE 25 MG/1
25 TABLET, EXTENDED RELEASE ORAL DAILY
Qty: 30 TABLET | Refills: 11 | Status: SHIPPED | OUTPATIENT
Start: 2023-09-25

## 2024-03-04 ENCOUNTER — OFFICE VISIT (OUTPATIENT)
Dept: CARDIOLOGY | Facility: CLINIC | Age: 49
End: 2024-03-04
Payer: COMMERCIAL

## 2024-03-04 VITALS
WEIGHT: 195 LBS | DIASTOLIC BLOOD PRESSURE: 83 MMHG | HEART RATE: 75 BPM | BODY MASS INDEX: 33.45 KG/M2 | OXYGEN SATURATION: 95 % | SYSTOLIC BLOOD PRESSURE: 119 MMHG

## 2024-03-04 DIAGNOSIS — R00.2 PALPITATIONS: Primary | ICD-10-CM

## 2024-03-04 DIAGNOSIS — R06.02 SHORTNESS OF BREATH: ICD-10-CM

## 2024-03-04 DIAGNOSIS — R07.9 CHEST PAIN, UNSPECIFIED TYPE: ICD-10-CM

## 2024-03-04 PROCEDURE — 99214 OFFICE O/P EST MOD 30 MIN: CPT | Performed by: PHYSICIAN ASSISTANT

## 2024-03-04 PROCEDURE — 93000 ELECTROCARDIOGRAM COMPLETE: CPT | Performed by: PHYSICIAN ASSISTANT

## 2024-03-04 NOTE — LETTER
March 4, 2024       No Recipients    Patient: Eva Saleem   YOB: 1975   Date of Visit: 3/4/2024       Dear James Barker MD    Eva Saleem was in my office today. Below is a copy of my note.    If you have questions, please do not hesitate to call me. I look forward to following Eva along with you.         Sincerely,        VENITA Delgado        CC:   No Recipients    Problem list     Subjective  Eva Saleem is a 49 y.o. female     Chief Complaint   Patient presents with   • Follow-up     YEARLY   problem list  1.  History of chest pain  1.1 regular treadmill stress test 2020 - for any electrocardiographic evidence of ischemia  1.2 cardiac CTA 2021 with no obstructive coronary disease identified  2.  Preserved systolic function  3.  Palpitations  3.1 event monitor 2020 with PACs and PVCs but no other arrhythmias identified  4.  GERD       HPI    Patient is a 49-year-old female presenting back to the office for routine cardiac assessment.    Patient has been experiencing more noticeable palpitations.  She gets the sensation of fluttering in her chest.  This can occasionally be associated with chest tightness.  She has been on beta-blocker therapy but began to notice it more prominently.    Her chest tightness can be associated with palpitations.  She does not describe any pressure or pain when exerting.  She has mild amounts of dyspnea.  No complaints of PND or orthopnea.    She does not describe dizziness, presyncope or syncope.  She otherwise has done well.      Current Outpatient Medications on File Prior to Visit   Medication Sig Dispense Refill   • acetaminophen (TYLENOL) 500 MG tablet Take 1 tablet by mouth Every 6 (Six) Hours As Needed for Mild Pain.     • Ascorbic Acid (Vitamin C) 500 MG capsule Take 1 tablet by mouth. 3 x weekly     • Cholecalciferol (Vitamin D-3) 25 MCG (1000 UT) capsule Take  by mouth Daily.     • Ferrous Sulfate (Iron) 142 (45 Fe) MG tablet  controlled-release 1 tablet. 3 x week     • ibuprofen (ADVIL,MOTRIN) 600 MG tablet Take 1 tablet by mouth Every 6 (Six) Hours As Needed for Mild Pain.     • metoprolol succinate XL (TOPROL-XL) 25 MG 24 hr tablet TAKE 1 TABLET BY MOUTH DAILY 30 tablet 11   • Multiple Vitamins-Calcium (ONE-A-DAY WOMENS FORMULA PO) Daily.     • nitroglycerin (NITROSTAT) 0.4 MG SL tablet 1 under the tongue as needed for angina, may repeat q5mins for up three doses 25 tablet 11   • omeprazole (priLOSEC) 40 MG capsule Take 1 capsule by mouth Daily.     • Rhubarb (ESTROVEN MENOPAUSE RELIEF PO) Take  by mouth Every Night.     • [DISCONTINUED] lisinopril (PRINIVIL,ZESTRIL) 10 MG tablet Take  by mouth Daily.     • [DISCONTINUED] vitamin B-12 (CYANOCOBALAMIN) 1000 MCG tablet Take 2 tablets by mouth Daily.       No current facility-administered medications on file prior to visit.       Patient has no known allergies.    Past Medical History:   Diagnosis Date   • Anemia    • Essential hypertension 10/11/2022   • GERD (gastroesophageal reflux disease)    • GERD (gastroesophageal reflux disease)    • HTN (hypertension)    • Seasonal allergies        Social History     Socioeconomic History   • Marital status:    Tobacco Use   • Smoking status: Never   • Smokeless tobacco: Never   Substance and Sexual Activity   • Alcohol use: Not Currently   • Drug use: Never   • Sexual activity: Defer       Family History   Problem Relation Age of Onset   • Lymphoma Mother    • Hypertension Father    • Diabetes Father    • Gout Sister    • Other Sister         gout   • Diabetes Brother    • No Known Problems Maternal Grandmother    • No Known Problems Maternal Grandfather    • No Known Problems Paternal Grandmother    • No Known Problems Paternal Grandfather        Review of Systems   Constitutional:  Negative for activity change, appetite change, chills, fatigue and fever.   HENT:  Negative for congestion.    Eyes: Negative.  Negative for visual  disturbance.   Respiratory:  Positive for shortness of breath. Negative for apnea, cough, chest tightness and wheezing.    Cardiovascular:  Positive for chest pain and palpitations. Negative for leg swelling.   Gastrointestinal: Negative.  Negative for blood in stool.   Endocrine: Negative.  Negative for cold intolerance and heat intolerance.   Genitourinary: Negative.  Negative for hematuria.   Musculoskeletal: Negative.  Negative for gait problem.   Skin: Negative.  Negative for color change, rash and wound.   Allergic/Immunologic: Negative.  Negative for environmental allergies and food allergies.   Neurological:  Positive for dizziness, light-headedness and headaches. Negative for syncope, weakness and numbness.   Hematological:  Bruises/bleeds easily.   Psychiatric/Behavioral: Negative.  Negative for sleep disturbance.        Objective  Vitals:    03/04/24 0902   BP: 119/83   BP Location: Left arm   Patient Position: Sitting   Cuff Size: Adult   Pulse: 75   SpO2: 95%   Weight: 88.5 kg (195 lb)      /83 (BP Location: Left arm, Patient Position: Sitting, Cuff Size: Adult)   Pulse 75   Wt 88.5 kg (195 lb)   SpO2 95%   BMI 33.45 kg/m²     Lab Results (most recent)       None            Physical Exam  Vitals and nursing note reviewed.   Constitutional:       General: She is not in acute distress.     Appearance: Normal appearance. She is well-developed.   HENT:      Head: Normocephalic and atraumatic.   Eyes:      General: No scleral icterus.        Right eye: No discharge.         Left eye: No discharge.      Conjunctiva/sclera: Conjunctivae normal.   Neck:      Vascular: No carotid bruit.   Cardiovascular:      Rate and Rhythm: Normal rate and regular rhythm.      Heart sounds: Normal heart sounds. No murmur heard.     No friction rub. No gallop.   Pulmonary:      Effort: Pulmonary effort is normal. No respiratory distress.      Breath sounds: Normal breath sounds. No wheezing or rales.   Chest:       Chest wall: No tenderness.   Musculoskeletal:      Right lower leg: No edema.      Left lower leg: No edema.   Skin:     General: Skin is warm and dry.      Coloration: Skin is not pale.      Findings: No erythema or rash.   Neurological:      Mental Status: She is alert and oriented to person, place, and time.      Cranial Nerves: No cranial nerve deficit.   Psychiatric:         Behavior: Behavior normal.         Procedure    ECG 12 Lead    Date/Time: 3/4/2024 9:05 AM  Performed by: Eric Pham PA    Authorized by: Eric Pham PA  Comparison: compared with previous ECG              Assessment & Plan    Problems Addressed this Visit          Cardiac and Vasculature    Palpitations - Primary    Relevant Orders    Treadmill Stress Test    Adult Transthoracic Echo Complete W/ Cont if Necessary Per Protocol    Cardiac Event Monitor    Chest pain    Relevant Orders    Treadmill Stress Test    Adult Transthoracic Echo Complete W/ Cont if Necessary Per Protocol    Cardiac Event Monitor       Pulmonary and Pneumonias    Shortness of breath    Relevant Orders    Treadmill Stress Test    Adult Transthoracic Echo Complete W/ Cont if Necessary Per Protocol    Cardiac Event Monitor     Diagnoses         Codes Comments    Palpitations    -  Primary ICD-10-CM: R00.2  ICD-9-CM: 785.1     Shortness of breath     ICD-10-CM: R06.02  ICD-9-CM: 786.05     Chest pain, unspecified type     ICD-10-CM: R07.9  ICD-9-CM: 786.50           Recommendation  1.  Patient is a 49-year-old female who presents to the office for evaluation.  She has complaints of palpitations.  We discussed options to consider.  We could potentially increase her beta-blocker therapy.  We can change it to a different medication because of her history of premature beats.  We could also consider an event monitor.  She has chosen the latter.  We will schedule for an event monitor to reevaluate.  We can hopefully quantify her premature beats to see if further  aggressive therapy is warranted.    2.  She will like to repeat cardiac testing.  She had cardiac CTA in 2021 with essentially normal coronaries.  However, we will repeat testing to evaluate.    3.  Regular treadmill stress test will be ordered for risk stratification.    4.  Echocardiogram to assess and evaluate LV systolic and diastolic performance, valvular structures etc.    5.  We will see her back for follow-up on above testing and recommend further.  If symptoms were to worsen, she was instructed to call the office.  Follow-up with primary as scheduled.           Patient did not bring med list or medicine bottles to appointment, med list has been reviewed and updated based on patient's knowledge of their meds.      Electronically signed by:

## 2024-03-04 NOTE — PROGRESS NOTES
Problem list     Subjective   Eva Saleem is a 49 y.o. female     Chief Complaint   Patient presents with   • Follow-up     YEARLY   problem list  1.  History of chest pain  1.1 regular treadmill stress test 2020 - for any electrocardiographic evidence of ischemia  1.2 cardiac CTA 2021 with no obstructive coronary disease identified  2.  Preserved systolic function  3.  Palpitations  3.1 event monitor 2020 with PACs and PVCs but no other arrhythmias identified  4.  GERD       HPI    Patient is a 49-year-old female presenting back to the office for routine cardiac assessment.    Patient has been experiencing more noticeable palpitations.  She gets the sensation of fluttering in her chest.  This can occasionally be associated with chest tightness.  She has been on beta-blocker therapy but began to notice it more prominently.    Her chest tightness can be associated with palpitations.  She does not describe any pressure or pain when exerting.  She has mild amounts of dyspnea.  No complaints of PND or orthopnea.    She does not describe dizziness, presyncope or syncope.  She otherwise has done well.      Current Outpatient Medications on File Prior to Visit   Medication Sig Dispense Refill   • acetaminophen (TYLENOL) 500 MG tablet Take 1 tablet by mouth Every 6 (Six) Hours As Needed for Mild Pain.     • Ascorbic Acid (Vitamin C) 500 MG capsule Take 1 tablet by mouth. 3 x weekly     • Cholecalciferol (Vitamin D-3) 25 MCG (1000 UT) capsule Take  by mouth Daily.     • Ferrous Sulfate (Iron) 142 (45 Fe) MG tablet controlled-release 1 tablet. 3 x week     • ibuprofen (ADVIL,MOTRIN) 600 MG tablet Take 1 tablet by mouth Every 6 (Six) Hours As Needed for Mild Pain.     • metoprolol succinate XL (TOPROL-XL) 25 MG 24 hr tablet TAKE 1 TABLET BY MOUTH DAILY 30 tablet 11   • Multiple Vitamins-Calcium (ONE-A-DAY WOMENS FORMULA PO) Daily.     • nitroglycerin (NITROSTAT) 0.4 MG SL tablet 1 under the tongue as needed for angina, may  repeat q5mins for up three doses 25 tablet 11   • omeprazole (priLOSEC) 40 MG capsule Take 1 capsule by mouth Daily.     • Rhubarb (ESTROVEN MENOPAUSE RELIEF PO) Take  by mouth Every Night.     • [DISCONTINUED] lisinopril (PRINIVIL,ZESTRIL) 10 MG tablet Take  by mouth Daily.     • [DISCONTINUED] vitamin B-12 (CYANOCOBALAMIN) 1000 MCG tablet Take 2 tablets by mouth Daily.       No current facility-administered medications on file prior to visit.       Patient has no known allergies.    Past Medical History:   Diagnosis Date   • Anemia    • Essential hypertension 10/11/2022   • GERD (gastroesophageal reflux disease)    • GERD (gastroesophageal reflux disease)    • HTN (hypertension)    • Seasonal allergies        Social History     Socioeconomic History   • Marital status:    Tobacco Use   • Smoking status: Never   • Smokeless tobacco: Never   Substance and Sexual Activity   • Alcohol use: Not Currently   • Drug use: Never   • Sexual activity: Defer       Family History   Problem Relation Age of Onset   • Lymphoma Mother    • Hypertension Father    • Diabetes Father    • Gout Sister    • Other Sister         gout   • Diabetes Brother    • No Known Problems Maternal Grandmother    • No Known Problems Maternal Grandfather    • No Known Problems Paternal Grandmother    • No Known Problems Paternal Grandfather        Review of Systems   Constitutional:  Negative for activity change, appetite change, chills, fatigue and fever.   HENT:  Negative for congestion.    Eyes: Negative.  Negative for visual disturbance.   Respiratory:  Positive for shortness of breath. Negative for apnea, cough, chest tightness and wheezing.    Cardiovascular:  Positive for chest pain and palpitations. Negative for leg swelling.   Gastrointestinal: Negative.  Negative for blood in stool.   Endocrine: Negative.  Negative for cold intolerance and heat intolerance.   Genitourinary: Negative.  Negative for hematuria.   Musculoskeletal:  Negative.  Negative for gait problem.   Skin: Negative.  Negative for color change, rash and wound.   Allergic/Immunologic: Negative.  Negative for environmental allergies and food allergies.   Neurological:  Positive for dizziness, light-headedness and headaches. Negative for syncope, weakness and numbness.   Hematological:  Bruises/bleeds easily.   Psychiatric/Behavioral: Negative.  Negative for sleep disturbance.        Objective   Vitals:    03/04/24 0902   BP: 119/83   BP Location: Left arm   Patient Position: Sitting   Cuff Size: Adult   Pulse: 75   SpO2: 95%   Weight: 88.5 kg (195 lb)      /83 (BP Location: Left arm, Patient Position: Sitting, Cuff Size: Adult)   Pulse 75   Wt 88.5 kg (195 lb)   SpO2 95%   BMI 33.45 kg/m²     Lab Results (most recent)       None            Physical Exam  Vitals and nursing note reviewed.   Constitutional:       General: She is not in acute distress.     Appearance: Normal appearance. She is well-developed.   HENT:      Head: Normocephalic and atraumatic.   Eyes:      General: No scleral icterus.        Right eye: No discharge.         Left eye: No discharge.      Conjunctiva/sclera: Conjunctivae normal.   Neck:      Vascular: No carotid bruit.   Cardiovascular:      Rate and Rhythm: Normal rate and regular rhythm.      Heart sounds: Normal heart sounds. No murmur heard.     No friction rub. No gallop.   Pulmonary:      Effort: Pulmonary effort is normal. No respiratory distress.      Breath sounds: Normal breath sounds. No wheezing or rales.   Chest:      Chest wall: No tenderness.   Musculoskeletal:      Right lower leg: No edema.      Left lower leg: No edema.   Skin:     General: Skin is warm and dry.      Coloration: Skin is not pale.      Findings: No erythema or rash.   Neurological:      Mental Status: She is alert and oriented to person, place, and time.      Cranial Nerves: No cranial nerve deficit.   Psychiatric:         Behavior: Behavior normal.        Procedure     ECG 12 Lead    Date/Time: 3/4/2024 9:05 AM  Performed by: Eric Pham PA    Authorized by: Eric Pham PA  Comparison: not compared with previous ECG   Comments: EKG demonstrates this rhythm at 69 bpm right axis deviation, low voltage, with no acute ST changes           Assessment & Plan     Problems Addressed this Visit          Cardiac and Vasculature    Palpitations - Primary    Relevant Orders    Treadmill Stress Test    Adult Transthoracic Echo Complete W/ Cont if Necessary Per Protocol    Cardiac Event Monitor    Chest pain    Relevant Orders    Treadmill Stress Test    Adult Transthoracic Echo Complete W/ Cont if Necessary Per Protocol    Cardiac Event Monitor       Pulmonary and Pneumonias    Shortness of breath    Relevant Orders    Treadmill Stress Test    Adult Transthoracic Echo Complete W/ Cont if Necessary Per Protocol    Cardiac Event Monitor     Diagnoses         Codes Comments    Palpitations    -  Primary ICD-10-CM: R00.2  ICD-9-CM: 785.1     Shortness of breath     ICD-10-CM: R06.02  ICD-9-CM: 786.05     Chest pain, unspecified type     ICD-10-CM: R07.9  ICD-9-CM: 786.50           Recommendation  1.  Patient is a 49-year-old female who presents to the office for evaluation.  She has complaints of palpitations.  We discussed options to consider.  We could potentially increase her beta-blocker therapy.  We can change it to a different medication because of her history of premature beats.  We could also consider an event monitor.  She has chosen the latter.  We will schedule for an event monitor to reevaluate.  We can hopefully quantify her premature beats to see if further aggressive therapy is warranted.    2.  She will like to repeat cardiac testing.  She had cardiac CTA in 2021 with essentially normal coronaries.  However, we will repeat testing to evaluate.    3.  Regular treadmill stress test will be ordered for risk stratification.    4.  Echocardiogram to  assess and evaluate LV systolic and diastolic performance, valvular structures etc.    5.  We will see her back for follow-up on above testing and recommend further.  If symptoms were to worsen, she was instructed to call the office.  Follow-up with primary as scheduled.           Patient did not bring med list or medicine bottles to appointment, med list has been reviewed and updated based on patient's knowledge of their meds.      Electronically signed by:

## 2024-04-11 ENCOUNTER — HOSPITAL ENCOUNTER (OUTPATIENT)
Dept: CARDIOLOGY | Facility: HOSPITAL | Age: 49
Discharge: HOME | End: 2024-04-11
Payer: COMMERCIAL

## 2024-04-11 DIAGNOSIS — R06.02 SHORTNESS OF BREATH: ICD-10-CM

## 2024-04-11 DIAGNOSIS — R00.2 PALPITATIONS: ICD-10-CM

## 2024-04-11 DIAGNOSIS — R07.9 CHEST PAIN, UNSPECIFIED TYPE: ICD-10-CM

## 2024-04-11 PROCEDURE — 93306 TTE W/DOPPLER COMPLETE: CPT

## 2024-04-11 PROCEDURE — 93017 CV STRESS TEST TRACING ONLY: CPT

## 2024-04-13 LAB
BH CV STRESS RECOVERY BP: NORMAL MMHG
BH CV STRESS RECOVERY HR: 109 BPM
MAXIMAL PREDICTED HEART RATE: 171 BPM
PERCENT MAX PREDICTED HR: 95.91 %
STRESS BASELINE BP: NORMAL MMHG
STRESS BASELINE HR: 76 BPM
STRESS PERCENT HR: 113 %
STRESS POST ESTIMATED WORKLOAD: 10.1 METS
STRESS POST EXERCISE DUR MIN: 7 MIN
STRESS POST EXERCISE DUR SEC: 48 SEC
STRESS POST PEAK BP: NORMAL MMHG
STRESS POST PEAK HR: 164 BPM
STRESS TARGET HR: 145 BPM

## 2024-04-14 LAB
AORTIC DIMENSIONLESS INDEX: 0.8 (DI)
BH CV ECHO MEAS - ACS: 2.01 CM
BH CV ECHO MEAS - AO MAX PG: 5.7 MMHG
BH CV ECHO MEAS - AO MEAN PG: 2.8 MMHG
BH CV ECHO MEAS - AO ROOT DIAM: 2.9 CM
BH CV ECHO MEAS - AO V2 MAX: 119.8 CM/SEC
BH CV ECHO MEAS - AO V2 VTI: 25.7 CM
BH CV ECHO MEAS - AVA(I,D): 2.6 CM2
BH CV ECHO MEAS - EDV(CUBED): 112 ML
BH CV ECHO MEAS - EDV(MOD-SP4): 58.6 ML
BH CV ECHO MEAS - EF(MOD-SP4): 63.3 %
BH CV ECHO MEAS - ESV(CUBED): 28.1 ML
BH CV ECHO MEAS - ESV(MOD-SP4): 21.5 ML
BH CV ECHO MEAS - FS: 36.9 %
BH CV ECHO MEAS - IVS/LVPW: 0.94 CM
BH CV ECHO MEAS - IVSD: 0.91 CM
BH CV ECHO MEAS - LA A2CS (ATRIAL LENGTH): 4.9 CM
BH CV ECHO MEAS - LA A4C LENGTH: 5 CM
BH CV ECHO MEAS - LA DIMENSION: 3.3 CM
BH CV ECHO MEAS - LAT PEAK E' VEL: 6.7 CM/SEC
BH CV ECHO MEAS - LV DIASTOLIC VOL/BSA (35-75): 30.3 CM2
BH CV ECHO MEAS - LV MASS(C)D: 157.4 GRAMS
BH CV ECHO MEAS - LV MAX PG: 3.3 MMHG
BH CV ECHO MEAS - LV MEAN PG: 1.61 MMHG
BH CV ECHO MEAS - LV SYSTOLIC VOL/BSA (12-30): 11.1 CM2
BH CV ECHO MEAS - LV V1 MAX: 90.9 CM/SEC
BH CV ECHO MEAS - LV V1 VTI: 20.8 CM
BH CV ECHO MEAS - LVIDD: 4.8 CM
BH CV ECHO MEAS - LVIDS: 3 CM
BH CV ECHO MEAS - LVOT AREA: 3.2 CM2
BH CV ECHO MEAS - LVOT DIAM: 2.01 CM
BH CV ECHO MEAS - LVPWD: 0.97 CM
BH CV ECHO MEAS - MED PEAK E' VEL: 5.2 CM/SEC
BH CV ECHO MEAS - MV A MAX VEL: 56.6 CM/SEC
BH CV ECHO MEAS - MV DEC SLOPE: 294.9 CM/SEC2
BH CV ECHO MEAS - MV E MAX VEL: 63.9 CM/SEC
BH CV ECHO MEAS - MV E/A: 1.13
BH CV ECHO MEAS - MV MAX PG: 2.27 MMHG
BH CV ECHO MEAS - MV MEAN PG: 0.99 MMHG
BH CV ECHO MEAS - MV P1/2T: 73.9 MSEC
BH CV ECHO MEAS - MV V2 VTI: 21.3 CM
BH CV ECHO MEAS - MVA(P1/2T): 3 CM2
BH CV ECHO MEAS - MVA(VTI): 3.1 CM2
BH CV ECHO MEAS - PA V2 MAX: 83.1 CM/SEC
BH CV ECHO MEAS - PI END-D VEL: 73.4 CM/SEC
BH CV ECHO MEAS - RAP SYSTOLE: 10 MMHG
BH CV ECHO MEAS - RV MAX PG: 2.05 MMHG
BH CV ECHO MEAS - RV V1 MAX: 71.7 CM/SEC
BH CV ECHO MEAS - RV V1 VTI: 15.9 CM
BH CV ECHO MEAS - RVSP: 27.6 MMHG
BH CV ECHO MEAS - SI(MOD-SP4): 19.2 ML/M2
BH CV ECHO MEAS - SV(LVOT): 66.4 ML
BH CV ECHO MEAS - SV(MOD-SP4): 37.1 ML
BH CV ECHO MEAS - TR MAX PG: 17.6 MMHG
BH CV ECHO MEAS - TR MAX VEL: 209.6 CM/SEC
BH CV ECHO MEASUREMENTS AVERAGE E/E' RATIO: 10.74
LEFT ATRIUM VOLUME INDEX: 14.5 ML/M2
LEFT ATRIUM VOLUME: 29 ML

## 2024-04-18 ENCOUNTER — TELEPHONE (OUTPATIENT)
Dept: CARDIOLOGY | Facility: CLINIC | Age: 49
End: 2024-04-18
Payer: COMMERCIAL

## 2024-04-18 NOTE — TELEPHONE ENCOUNTER
----- Message from MARGARETTE Ocampo sent at 4/18/2024 10:44 AM EDT -----    ----- Message -----  From: Eric Pham PA  Sent: 4/15/2024   1:32 PM EDT  To: MARGARETTE Ocampo    Can address on routine follow-up

## 2024-04-18 NOTE — TELEPHONE ENCOUNTER
"Relay     \"Calling to inform you there were no acute findings or abnormalities on your STRESS TEST . Please keep your next scheduled appointment and give us a call with any new or worsening symptoms. Proceed to ER if symptoms worsen after hours. Thank you \"  "

## 2024-04-18 NOTE — TELEPHONE ENCOUNTER
"Relay     \"Calling to inform you there were no acute findings or abnormalities on your HEART MONITOR. Please keep your next scheduled appointment and give us a call with any new or worsening symptoms. Proceed to ER if symptoms worsen after hours. Thank you \"  "

## 2024-04-18 NOTE — TELEPHONE ENCOUNTER
----- Message from MARGARETTE Ocampo sent at 4/18/2024 10:44 AM EDT -----    ----- Message -----  From: Eric Pham PA  Sent: 4/15/2024   1:32 PM EDT  To: MARGARETTE Ocampo    Routine follow-up

## 2024-04-18 NOTE — TELEPHONE ENCOUNTER
"Relay     \"Calling to inform you there were no acute findings or abnormalities on your echo. Please keep your next scheduled appointment and give us a call with any new or worsening symptoms. Proceed to ER if symptoms worsen after hours. Thank you \"  "

## 2024-09-05 ENCOUNTER — OFFICE VISIT (OUTPATIENT)
Dept: CARDIOLOGY | Facility: CLINIC | Age: 49
End: 2024-09-05
Payer: COMMERCIAL

## 2024-09-05 VITALS
OXYGEN SATURATION: 97 % | HEIGHT: 63 IN | HEART RATE: 68 BPM | SYSTOLIC BLOOD PRESSURE: 125 MMHG | BODY MASS INDEX: 34.55 KG/M2 | WEIGHT: 195 LBS | DIASTOLIC BLOOD PRESSURE: 80 MMHG

## 2024-09-05 DIAGNOSIS — R00.2 PALPITATIONS: Primary | ICD-10-CM

## 2024-09-05 DIAGNOSIS — I10 ESSENTIAL HYPERTENSION: ICD-10-CM

## 2024-09-05 NOTE — LETTER
September 5, 2024       No Recipients    Patient: Eva Saleem   YOB: 1975   Date of Visit: 9/5/2024       Dear James Barker MD    Eva Saleem was in my office today. Below is a copy of my note.    If you have questions, please do not hesitate to call me. I look forward to following Eva along with you.         Sincerely,        VENITA Delgado        CC:   No Recipients    Problem list     Subjective  Eva Saleem is a 49 y.o. female     Chief Complaint   Patient presents with   • Testing follow up     Palpitations   problem list  1.  History of chest pain  1.1 regular treadmill stress test 2020 - for any electrocardiographic evidence of ischemia  1.2 cardiac CTA 2021 with no obstructive coronary disease identified  1.3 low risk regular treadmill stress test April 2024  2.  Preserved systolic function  3.  Palpitations  3.1 event monitor 2020 with PACs and PVCs but no other arrhythmias identified  3.2 event monitor April 2024 with PVCs noted but burden less than 1%.  No other arrhythmias noted.  4.  GERD    HPI    Patient is a 49-year-old female presenting back to the office for routine cardiac assessment.  Patient has done relatively well since her last visit.  She does not describe chest pain or pressure.  No complaints of dyspnea.  No PND or orthopnea.    She occasionally palpitates.  She will sense a flutter sensation at times.  This appears to be manageable.  She does not describe progression.  She does describe anxiety potentially contributing.  She is on metoprolol therapy.  Otherwise, she is stable.      Current Outpatient Medications on File Prior to Visit   Medication Sig Dispense Refill   • acetaminophen (TYLENOL) 500 MG tablet Take 1 tablet by mouth Every 6 (Six) Hours As Needed for Mild Pain.     • Ascorbic Acid (Vitamin C) 500 MG capsule Take 1 tablet by mouth. 3 x weekly     • Cholecalciferol (Vitamin D-3) 25 MCG (1000 UT) capsule Take  by mouth Daily.     •  Ferrous Sulfate (Iron) 142 (45 Fe) MG tablet controlled-release 1 tablet. 3 x week     • ibuprofen (ADVIL,MOTRIN) 600 MG tablet Take 1 tablet by mouth Every 6 (Six) Hours As Needed for Mild Pain.     • metoprolol succinate XL (TOPROL-XL) 25 MG 24 hr tablet TAKE 1 TABLET BY MOUTH DAILY 30 tablet 11   • Multiple Vitamins-Calcium (ONE-A-DAY WOMENS FORMULA PO) Daily.     • nitroglycerin (NITROSTAT) 0.4 MG SL tablet 1 under the tongue as needed for angina, may repeat q5mins for up three doses 25 tablet 11   • omeprazole (priLOSEC) 40 MG capsule Take 1 capsule by mouth Daily.     • Rhubarb (ESTROVEN MENOPAUSE RELIEF PO) Take  by mouth Every Night.     • [DISCONTINUED] lisinopril (PRINIVIL,ZESTRIL) 10 MG tablet Take  by mouth Daily.       No current facility-administered medications on file prior to visit.       Patient has no known allergies.    Past Medical History:   Diagnosis Date   • Anemia    • Essential hypertension 10/11/2022   • GERD (gastroesophageal reflux disease)    • GERD (gastroesophageal reflux disease)    • HTN (hypertension)    • Seasonal allergies        Social History     Socioeconomic History   • Marital status:    Tobacco Use   • Smoking status: Never   • Smokeless tobacco: Never   Substance and Sexual Activity   • Alcohol use: Not Currently   • Drug use: Never   • Sexual activity: Defer       Family History   Problem Relation Age of Onset   • Lymphoma Mother    • Hypertension Father    • Diabetes Father    • Gout Sister    • Other Sister         gout   • Diabetes Brother    • No Known Problems Maternal Grandmother    • No Known Problems Maternal Grandfather    • No Known Problems Paternal Grandmother    • No Known Problems Paternal Grandfather        Review of Systems   Constitutional: Negative.  Negative for activity change, appetite change, chills, fatigue and fever.   HENT: Negative.  Negative for congestion, sinus pressure and sinus pain.    Eyes: Negative.  Negative for visual  "disturbance.   Respiratory: Negative.  Negative for apnea, cough, chest tightness, shortness of breath and wheezing.    Cardiovascular: Negative.  Positive for palpitations. Negative for chest pain and leg swelling.   Gastrointestinal: Negative.  Negative for blood in stool.   Endocrine: Negative.  Negative for cold intolerance and heat intolerance.   Genitourinary: Negative.  Negative for hematuria.   Musculoskeletal: Negative.  Negative for gait problem.   Skin:  Negative for color change, rash and wound.   Allergic/Immunologic: Negative for environmental allergies and food allergies.   Neurological:  Negative for dizziness, syncope, weakness, light-headedness, numbness and headaches.   Hematological: Negative.  Does not bruise/bleed easily.   Psychiatric/Behavioral: Negative.  Negative for sleep disturbance.        Objective  Vitals:    09/05/24 0848   BP: 125/80   BP Location: Right arm   Patient Position: Sitting   Cuff Size: Adult   Pulse: 68   SpO2: 97%   Weight: 88.5 kg (195 lb)   Height: 160 cm (63\")      /80 (BP Location: Right arm, Patient Position: Sitting, Cuff Size: Adult)   Pulse 68   Ht 160 cm (63\")   Wt 88.5 kg (195 lb)   SpO2 97%   BMI 34.54 kg/m²     Lab Results (most recent)       None            Physical Exam  Vitals and nursing note reviewed.   Constitutional:       General: She is not in acute distress.     Appearance: Normal appearance. She is well-developed.   HENT:      Head: Normocephalic and atraumatic.   Eyes:      General: No scleral icterus.        Right eye: No discharge.         Left eye: No discharge.      Conjunctiva/sclera: Conjunctivae normal.   Neck:      Vascular: No carotid bruit.   Cardiovascular:      Rate and Rhythm: Normal rate and regular rhythm.      Heart sounds: Normal heart sounds. No murmur heard.     No friction rub. No gallop.   Pulmonary:      Effort: Pulmonary effort is normal. No respiratory distress.      Breath sounds: Normal breath sounds. No " wheezing or rales.   Chest:      Chest wall: No tenderness.   Musculoskeletal:      Right lower leg: No edema.      Left lower leg: No edema.   Skin:     General: Skin is warm and dry.      Coloration: Skin is not pale.      Findings: No erythema or rash.   Neurological:      Mental Status: She is alert and oriented to person, place, and time.      Cranial Nerves: No cranial nerve deficit.   Psychiatric:         Behavior: Behavior normal.         Procedure  Procedures       Assessment & Plan    Problems Addressed this Visit          Cardiac and Vasculature    Palpitations - Primary    Essential hypertension     Diagnoses         Codes Comments    Palpitations    -  Primary ICD-10-CM: R00.2  ICD-9-CM: 785.1     Essential hypertension     ICD-10-CM: I10  ICD-9-CM: 401.9           Recommendations  1.  Patient is a 49-year-old female presenting back to the office for assessment unremarkably well.  She has no angina no anginal current symptoms.  She occasionally will feel palpitations as a fluttering type sensation.  These have been much improved.  Patient is on beta-blocker therapy and we will continue.    2.  Patient with baseline hypertension in the past but blood pressures have been doing well on metoprolol.  I will make no changes.    3.  Clinically, patient is doing remarkable.  She has low risk testing.  We can see her back for follow-up in a year or sooner if needed.  Follow-up with primary as scheduled.             Patient did not bring med list or medicine bottles to appointment, med list has been reviewed and updated based on patient's knowledge of their meds.    Electronically signed by:

## 2024-09-05 NOTE — PROGRESS NOTES
Problem list     Subjective   Eva Saleem is a 49 y.o. female     Chief Complaint   Patient presents with    Testing follow up     Palpitations   problem list  1.  History of chest pain  1.1 regular treadmill stress test 2020 - for any electrocardiographic evidence of ischemia  1.2 cardiac CTA 2021 with no obstructive coronary disease identified  1.3 low risk regular treadmill stress test April 2024  2.  Preserved systolic function  3.  Palpitations  3.1 event monitor 2020 with PACs and PVCs but no other arrhythmias identified  3.2 event monitor April 2024 with PVCs noted but burden less than 1%.  No other arrhythmias noted.  4.  GERD    HPI    Patient is a 49-year-old female presenting back to the office for routine cardiac assessment.  Patient has done relatively well since her last visit.  She does not describe chest pain or pressure.  No complaints of dyspnea.  No PND or orthopnea.    She occasionally palpitates.  She will sense a flutter sensation at times.  This appears to be manageable.  She does not describe progression.  She does describe anxiety potentially contributing.  She is on metoprolol therapy.  Otherwise, she is stable.      Current Outpatient Medications on File Prior to Visit   Medication Sig Dispense Refill    acetaminophen (TYLENOL) 500 MG tablet Take 1 tablet by mouth Every 6 (Six) Hours As Needed for Mild Pain.      Ascorbic Acid (Vitamin C) 500 MG capsule Take 1 tablet by mouth. 3 x weekly      Cholecalciferol (Vitamin D-3) 25 MCG (1000 UT) capsule Take  by mouth Daily.      Ferrous Sulfate (Iron) 142 (45 Fe) MG tablet controlled-release 1 tablet. 3 x week      ibuprofen (ADVIL,MOTRIN) 600 MG tablet Take 1 tablet by mouth Every 6 (Six) Hours As Needed for Mild Pain.      metoprolol succinate XL (TOPROL-XL) 25 MG 24 hr tablet TAKE 1 TABLET BY MOUTH DAILY 30 tablet 11    Multiple Vitamins-Calcium (ONE-A-DAY WOMENS FORMULA PO) Daily.      nitroglycerin (NITROSTAT) 0.4 MG SL tablet 1 under  the tongue as needed for angina, may repeat q5mins for up three doses 25 tablet 11    omeprazole (priLOSEC) 40 MG capsule Take 1 capsule by mouth Daily.      Rhubarb (ESTROVEN MENOPAUSE RELIEF PO) Take  by mouth Every Night.      [DISCONTINUED] lisinopril (PRINIVIL,ZESTRIL) 10 MG tablet Take  by mouth Daily.       No current facility-administered medications on file prior to visit.       Patient has no known allergies.    Past Medical History:   Diagnosis Date    Anemia     Essential hypertension 10/11/2022    GERD (gastroesophageal reflux disease)     GERD (gastroesophageal reflux disease)     HTN (hypertension)     Seasonal allergies        Social History     Socioeconomic History    Marital status:    Tobacco Use    Smoking status: Never    Smokeless tobacco: Never   Substance and Sexual Activity    Alcohol use: Not Currently    Drug use: Never    Sexual activity: Defer       Family History   Problem Relation Age of Onset    Lymphoma Mother     Hypertension Father     Diabetes Father     Gout Sister     Other Sister         gout    Diabetes Brother     No Known Problems Maternal Grandmother     No Known Problems Maternal Grandfather     No Known Problems Paternal Grandmother     No Known Problems Paternal Grandfather        Review of Systems   Constitutional: Negative.  Negative for activity change, appetite change, chills, fatigue and fever.   HENT: Negative.  Negative for congestion, sinus pressure and sinus pain.    Eyes: Negative.  Negative for visual disturbance.   Respiratory: Negative.  Negative for apnea, cough, chest tightness, shortness of breath and wheezing.    Cardiovascular: Negative.  Positive for palpitations. Negative for chest pain and leg swelling.   Gastrointestinal: Negative.  Negative for blood in stool.   Endocrine: Negative.  Negative for cold intolerance and heat intolerance.   Genitourinary: Negative.  Negative for hematuria.   Musculoskeletal: Negative.  Negative for gait  "problem.   Skin:  Negative for color change, rash and wound.   Allergic/Immunologic: Negative for environmental allergies and food allergies.   Neurological:  Negative for dizziness, syncope, weakness, light-headedness, numbness and headaches.   Hematological: Negative.  Does not bruise/bleed easily.   Psychiatric/Behavioral: Negative.  Negative for sleep disturbance.        Objective   Vitals:    09/05/24 0848   BP: 125/80   BP Location: Right arm   Patient Position: Sitting   Cuff Size: Adult   Pulse: 68   SpO2: 97%   Weight: 88.5 kg (195 lb)   Height: 160 cm (63\")      /80 (BP Location: Right arm, Patient Position: Sitting, Cuff Size: Adult)   Pulse 68   Ht 160 cm (63\")   Wt 88.5 kg (195 lb)   SpO2 97%   BMI 34.54 kg/m²     Lab Results (most recent)       None            Physical Exam  Vitals and nursing note reviewed.   Constitutional:       General: She is not in acute distress.     Appearance: Normal appearance. She is well-developed.   HENT:      Head: Normocephalic and atraumatic.   Eyes:      General: No scleral icterus.        Right eye: No discharge.         Left eye: No discharge.      Conjunctiva/sclera: Conjunctivae normal.   Neck:      Vascular: No carotid bruit.   Cardiovascular:      Rate and Rhythm: Normal rate and regular rhythm.      Heart sounds: Normal heart sounds. No murmur heard.     No friction rub. No gallop.   Pulmonary:      Effort: Pulmonary effort is normal. No respiratory distress.      Breath sounds: Normal breath sounds. No wheezing or rales.   Chest:      Chest wall: No tenderness.   Musculoskeletal:      Right lower leg: No edema.      Left lower leg: No edema.   Skin:     General: Skin is warm and dry.      Coloration: Skin is not pale.      Findings: No erythema or rash.   Neurological:      Mental Status: She is alert and oriented to person, place, and time.      Cranial Nerves: No cranial nerve deficit.   Psychiatric:         Behavior: Behavior normal. "         Procedure   Procedures       Assessment & Plan     Problems Addressed this Visit          Cardiac and Vasculature    Palpitations - Primary    Essential hypertension     Diagnoses         Codes Comments    Palpitations    -  Primary ICD-10-CM: R00.2  ICD-9-CM: 785.1     Essential hypertension     ICD-10-CM: I10  ICD-9-CM: 401.9           Recommendations  1.  Patient is a 49-year-old female presenting back to the office for assessment unremarkably well.  She has no angina no anginal current symptoms.  She occasionally will feel palpitations as a fluttering type sensation.  These have been much improved.  Patient is on beta-blocker therapy and we will continue.    2.  Patient with baseline hypertension in the past but blood pressures have been doing well on metoprolol.  I will make no changes.    3.  Clinically, patient is doing remarkable.  She has low risk testing.  We can see her back for follow-up in a year or sooner if needed.  Follow-up with primary as scheduled.             Patient did not bring med list or medicine bottles to appointment, med list has been reviewed and updated based on patient's knowledge of their meds.    Electronically signed by:

## 2024-09-20 RX ORDER — METOPROLOL SUCCINATE 25 MG/1
25 TABLET, EXTENDED RELEASE ORAL DAILY
Qty: 30 TABLET | Refills: 11 | Status: SHIPPED | OUTPATIENT
Start: 2024-09-20

## 2025-04-30 NOTE — TELEPHONE ENCOUNTER
Called and advised pt to keep a log of her pulse rate and to check in twice a day.  Left mess for pt also that if she starts feeling worse and if she keeps having problems to please go to the Er or her PCP    Lexy Ponce / CORTNEY    Patient was admitted 10/2024 with witnessed tonic-clonic seizures  Diagnosis metabolic, possibly related to alcohol withdrawal: Was eval by neurology and was not recommended to start antiepileptic therapy